# Patient Record
Sex: MALE | Race: WHITE | NOT HISPANIC OR LATINO
[De-identification: names, ages, dates, MRNs, and addresses within clinical notes are randomized per-mention and may not be internally consistent; named-entity substitution may affect disease eponyms.]

---

## 2021-05-24 ENCOUNTER — TRANSCRIPTION ENCOUNTER (OUTPATIENT)
Age: 66
End: 2021-05-24

## 2022-07-23 ENCOUNTER — NON-APPOINTMENT (OUTPATIENT)
Age: 67
End: 2022-07-23

## 2022-11-21 ENCOUNTER — APPOINTMENT (OUTPATIENT)
Dept: ORTHOPEDIC SURGERY | Facility: CLINIC | Age: 67
End: 2022-11-21

## 2023-12-01 ENCOUNTER — APPOINTMENT (OUTPATIENT)
Dept: ORTHOPEDIC SURGERY | Facility: CLINIC | Age: 68
End: 2023-12-01
Payer: MEDICARE

## 2023-12-01 VITALS — WEIGHT: 220 LBS | BODY MASS INDEX: 30.8 KG/M2 | HEIGHT: 71 IN

## 2023-12-01 DIAGNOSIS — Z86.79 PERSONAL HISTORY OF OTHER DISEASES OF THE CIRCULATORY SYSTEM: ICD-10-CM

## 2023-12-01 DIAGNOSIS — M25.462 EFFUSION, LEFT KNEE: ICD-10-CM

## 2023-12-01 PROCEDURE — 20611 DRAIN/INJ JOINT/BURSA W/US: CPT | Mod: 50

## 2023-12-01 PROCEDURE — 73564 X-RAY EXAM KNEE 4 OR MORE: CPT | Mod: 50

## 2023-12-01 PROCEDURE — 99204 OFFICE O/P NEW MOD 45 MIN: CPT | Mod: 25

## 2023-12-01 PROCEDURE — J3490M: CUSTOM | Mod: NC

## 2023-12-01 RX ORDER — ATORVASTATIN CALCIUM 80 MG/1
TABLET, FILM COATED ORAL
Refills: 0 | Status: ACTIVE | COMMUNITY

## 2023-12-01 RX ORDER — METOPROLOL TARTRATE 75 MG/1
TABLET, FILM COATED ORAL
Refills: 0 | Status: ACTIVE | COMMUNITY

## 2023-12-01 RX ORDER — AMLODIPINE BESYLATE 5 MG/1
TABLET ORAL
Refills: 0 | Status: ACTIVE | COMMUNITY

## 2024-01-08 ENCOUNTER — APPOINTMENT (OUTPATIENT)
Dept: ORTHOPEDIC SURGERY | Facility: CLINIC | Age: 69
End: 2024-01-08
Payer: MEDICARE

## 2024-01-08 VITALS — BODY MASS INDEX: 30.8 KG/M2 | WEIGHT: 220 LBS | HEIGHT: 71 IN

## 2024-01-08 PROCEDURE — 99215 OFFICE O/P EST HI 40 MIN: CPT

## 2024-01-08 RX ORDER — TRAMADOL HYDROCHLORIDE 50 MG/1
50 TABLET, COATED ORAL EVERY 8 HOURS
Qty: 30 | Refills: 0 | Status: ACTIVE | COMMUNITY
Start: 2024-01-08 | End: 1900-01-01

## 2024-01-08 NOTE — IMAGING
[Bilateral] : knee bilaterally [advanced tricompartmental OA with medial compartment narrowing and varus alignment] : advanced tricompartmental OA with medial compartment narrowing and varus alignment [advanced tricompartmental OA with lateral compartment narrowing and valgus alignment] : advanced tricompartmental OA with lateral compartment narrowing and valgus alignment [de-identified] : B/L knee exam:  large effusion  L knee large baker cyst L knee MJL tenderness 4/5 hip flexion  5/5 quad and hamstring  +2 PT   No pain with range of motion bilateral hips

## 2024-01-08 NOTE — DISCUSSION/SUMMARY
[de-identified] : The natural progression of Osteoarthritis was explained to the patient. We discussed the possible treatment options from conservative to operative. These included NSAIDS, Glucosamine and Chondroitin sulfate, and Physical Therapy as well different types of injections. We also discussed that at some point they may progress to needing a TKA.  Information and pamphlets were given when appropriate.   Patient Complains of pain in Knee with a level that often reaches greater than an 8/10. The Pain has been progressively worsening of his/her treatment course. The pain has interfered with their ADLs and worsens with weight bearing. On exam they often have episodes of swelling/effusion with limited ROM. Pain worsens with ROM passive and active and I can palpate crepitus.   X-rays were reviewed with the patient, and they show joint space narrowing, subchondral sclerosis, osteophyte formation, and subchondral cysts.   After a period of more than 12 weeks physical therapy or exercise program done with me or another treating physician, they have continued pain. The patient has failed a trial of NSAID medication or pain relievers if they were unable to tolerate NSAID medications as well as a series of injection, steroid or Hyaluronic Acid. After a long discussion with the patient both the patient and I have decided we have exhausted all forms of less radical treatments, and they would like to proceed with Total Knee Replacement   We discussed my findings and the natural history of their condition. We talked about the details of the proposed surgery and the recovery. We discussed the material risks, possible benefits and alternatives to surgery. The risks include but are not limited to infection, bleeding and possible need for blood transfusion, fracture, bowel blockage, bladder retention or infection, need for reoperation, stiffness and/or limited range of motion, possible damage to nerves and blood vessels, failure of fixation of components, risk of deep vein thromboses and pulmonary embolism, wound healing problems, dislocation, and possible leg length discrepancy. Although incredibly rare, we also discussed the risks of a cardiac event, stroke and even death during, or following, the surgery. We discussed the type of implants the patient will be receiving and the type of fixation that will be used, as well as whether a robot or computer navigation aide will be used. The patient understands they will need medical clearance and will attend a preoperative joint education class. We also discussed the type of anesthesia they will receive, and the risks associated with hospital or rehab length of stay, obesity, diabetes and smoking.

## 2024-01-08 NOTE — ASSESSMENT
[FreeTextEntry1] : 1/8/2024: Discussed treatment options for B/L Adv Knee OA.  Patient does have some complex history with a neurogenic bladder and self-catheterization history of failed stent with collaterals atrial fibrillation and on Eliquis.  Also had a history of 2 small seizures recently and is on Keppra for this.  PT has exhausted conservative treatment options in the form of PT and injection therapy. Discussed Knee TKA. PT inquired about doing both knees but discussed risks due to his medical hx  and would recommend doing 1 TKA  at a time. PT would like to proceed with L  knee TKA and   R knee TKA. Plan for B/L  Knee CT scan in compliance with RUI protocol. PT is not a candidate for NSAIDs due to being on Eliquis. Pt will obtain neurological and cardiac clearance before surgery

## 2024-01-08 NOTE — HISTORY OF PRESENT ILLNESS
[10] : 10 [de-identified] : 1/8/24: Prev seen by Martín and reports B/L knee pain. Had prev aspiration last month. Pt expresses 2 weeks of relief from previous injection therapy. Pt has stent and hx of  ablations and seizures. Pt is on Kepra and Eliquis. Pt has neurological and bladder medical complications and Cerasis.    [] : no [FreeTextEntry5] : Here for LAURA knee evaluation. Amish Juarez. Pain started 2021. Has gotten viso 3 injection in 11/13/23 by an outside orthopedic. [place] no relief. Seen Anjelica stated he had arthritis. Had done aspiration and CSI felt relief for couple days. .   [de-identified] : XR

## 2024-01-11 ENCOUNTER — APPOINTMENT (OUTPATIENT)
Dept: CT IMAGING | Facility: CLINIC | Age: 69
End: 2024-01-11

## 2024-01-11 PROCEDURE — ZZZZZ: CPT

## 2024-01-22 ENCOUNTER — APPOINTMENT (OUTPATIENT)
Dept: ORTHOPEDIC SURGERY | Facility: CLINIC | Age: 69
End: 2024-01-22
Payer: MEDICARE

## 2024-01-22 VITALS — BODY MASS INDEX: 30.8 KG/M2 | HEIGHT: 71 IN | WEIGHT: 220 LBS

## 2024-01-22 PROCEDURE — 20610 DRAIN/INJ JOINT/BURSA W/O US: CPT | Mod: 50

## 2024-01-22 PROCEDURE — 99214 OFFICE O/P EST MOD 30 MIN: CPT | Mod: 25

## 2024-01-22 NOTE — IMAGING
[Bilateral] : knee bilaterally [advanced tricompartmental OA with medial compartment narrowing and varus alignment] : advanced tricompartmental OA with medial compartment narrowing and varus alignment [advanced tricompartmental OA with lateral compartment narrowing and valgus alignment] : advanced tricompartmental OA with lateral compartment narrowing and valgus alignment [de-identified] : B/L knee exam:  large effusion  L knee large baker cyst L knee MJL tenderness 4/5 hip flexion  5/5 quad and hamstring  +2 PT   No pain with range of motion bilateral hips

## 2024-01-22 NOTE — DISCUSSION/SUMMARY
[de-identified] : The natural progression of Osteoarthritis was explained to the patient. We discussed the possible treatment options from conservative to operative. These included NSAIDS, Glucosamine and Chondroitin sulfate, and Physical Therapy as well different types of injections. We also discussed that at some point they may progress to needing a TKA.  Information and pamphlets were given when appropriate.   Patient Complains of pain in Knee with a level that often reaches greater than an 8/10. The Pain has been progressively worsening of his/her treatment course. The pain has interfered with their ADLs and worsens with weight bearing. On exam they often have episodes of swelling/effusion with limited ROM. Pain worsens with ROM passive and active and I can palpate crepitus.   X-rays were reviewed with the patient, and they show joint space narrowing, subchondral sclerosis, osteophyte formation, and subchondral cysts.   After a period of more than 12 weeks physical therapy or exercise program done with me or another treating physician, they have continued pain. The patient has failed a trial of NSAID medication or pain relievers if they were unable to tolerate NSAID medications as well as a series of injection, steroid or Hyaluronic Acid. After a long discussion with the patient both the patient and I have decided we have exhausted all forms of less radical treatments, and they would like to proceed with Total Knee Replacement   We discussed my findings and the natural history of their condition. We talked about the details of the proposed surgery and the recovery. We discussed the material risks, possible benefits and alternatives to surgery. The risks include but are not limited to infection, bleeding and possible need for blood transfusion, fracture, bowel blockage, bladder retention or infection, need for reoperation, stiffness and/or limited range of motion, possible damage to nerves and blood vessels, failure of fixation of components, risk of deep vein thromboses and pulmonary embolism, wound healing problems, dislocation, and possible leg length discrepancy. Although incredibly rare, we also discussed the risks of a cardiac event, stroke and even death during, or following, the surgery. We discussed the type of implants the patient will be receiving and the type of fixation that will be used, as well as whether a robot or computer navigation aide will be used. The patient understands they will need medical clearance and will attend a preoperative joint education class. We also discussed the type of anesthesia they will receive, and the risks associated with hospital or rehab length of stay, obesity, diabetes and smoking.

## 2024-01-22 NOTE — PROCEDURE
[Large Joint Injection] : Large joint injection [Bilateral] : bilaterally of the [Knee] : knee [Pain] : pain [Inflammation] : inflammation [X-ray evidence of Osteoarthritis on this or prior visit] : x-ray evidence of Osteoarthritis on this or prior visit [Betadine] : betadine [Ethyl Chloride sprayed topically] : ethyl chloride sprayed topically [Sterile technique used] : sterile technique used [] : Patient tolerated procedure well [___ cc    32 units 5mg] : Zilretta ~Vcc of 32 units 5 mg  [Call if redness, pain or fever occur] : call if redness, pain or fever occur [Apply ice for 15min out of every hour for the next 12-24 hours as tolerated] : apply ice for 15 minutes out of every hour for the next 12-24 hours as tolerated [Previous OTC use and PT nontherapeutic] : patient has tried OTC's including aspirin, Ibuprofen, Aleve, etc or prescription NSAIDS, and/or exercises at home and/or physical therapy without satisfactory response [Patient had decreased mobility in the joint] : patient had decreased mobility in the joint [Risks, benefits, alternatives discussed / Verbal consent obtained] : the risks benefits, and alternatives have been discussed, and verbal consent was obtained

## 2024-01-22 NOTE — ASSESSMENT
[FreeTextEntry1] : Previous doc: 1/8/2024: Discussed treatment options for B/L Adv Knee OA.  Patient does have some complex history with a neurogenic bladder and self-catheterization history of failed stent with collaterals atrial fibrillation and on Eliquis.  Also had a history of 2 small seizures recently and is on Keppra for this.  PT has exhausted conservative treatment options in the form of PT and injection therapy. Discussed Knee TKA. PT inquired about doing both knees but discussed risks due to his medical hx  and would recommend doing 1 TKA  at a time. PT would like to proceed with L  knee TKA and   R knee TKA. Plan for B/L  Knee CT scan in compliance with RUI protocol. PT is not a candidate for NSAIDs due to being on Eliquis. Pt will obtain neurological and cardiac clearance before surgery  1/22/24: Planning for TKA in April, will try zilretta both knees today and proceed with surgery end of April.  CT reviewed.

## 2024-01-22 NOTE — HISTORY OF PRESENT ILLNESS
[10] : 10 [de-identified] : 1/22/24: Worsening pain, had CT both knees done and scheduled for TKA April 24.  Previous doc: 1/8/24: Prev seen by Martín and reports B/L knee pain. Had prev aspiration last month. Pt expresses 2 weeks of relief from previous injection therapy. Pt has stent and hx of  ablations and seizures. Pt is on Kepra and Eliquis. Pt has neurological and bladder medical complications and Cerasis.    [] : no [FreeTextEntry5] : wants to get csi inj.  [de-identified] : XR  foot pain/injury

## 2024-03-23 ENCOUNTER — NON-APPOINTMENT (OUTPATIENT)
Age: 69
End: 2024-03-23

## 2024-04-04 ENCOUNTER — OUTPATIENT (OUTPATIENT)
Dept: OUTPATIENT SERVICES | Facility: HOSPITAL | Age: 69
LOS: 1 days | Discharge: HOME | End: 2024-04-04
Payer: MEDICARE

## 2024-04-04 VITALS
HEART RATE: 90 BPM | HEIGHT: 71 IN | DIASTOLIC BLOOD PRESSURE: 85 MMHG | SYSTOLIC BLOOD PRESSURE: 138 MMHG | WEIGHT: 220.46 LBS | OXYGEN SATURATION: 95 % | RESPIRATION RATE: 18 BRPM | TEMPERATURE: 98 F

## 2024-04-04 DIAGNOSIS — M17.12 UNILATERAL PRIMARY OSTEOARTHRITIS, LEFT KNEE: ICD-10-CM

## 2024-04-04 DIAGNOSIS — Z98.890 OTHER SPECIFIED POSTPROCEDURAL STATES: Chronic | ICD-10-CM

## 2024-04-04 DIAGNOSIS — R56.9 UNSPECIFIED CONVULSIONS: ICD-10-CM

## 2024-04-04 DIAGNOSIS — R33.9 RETENTION OF URINE, UNSPECIFIED: ICD-10-CM

## 2024-04-04 DIAGNOSIS — Z01.818 ENCOUNTER FOR OTHER PREPROCEDURAL EXAMINATION: ICD-10-CM

## 2024-04-04 LAB
A1C WITH ESTIMATED AVERAGE GLUCOSE RESULT: 5.4 % — SIGNIFICANT CHANGE UP (ref 4–5.6)
ALBUMIN SERPL ELPH-MCNC: 4 G/DL — SIGNIFICANT CHANGE UP (ref 3.3–5)
ALP SERPL-CCNC: 46 U/L — SIGNIFICANT CHANGE UP (ref 40–120)
ALT FLD-CCNC: 40 U/L — SIGNIFICANT CHANGE UP (ref 12–78)
ANION GAP SERPL CALC-SCNC: 9 MMOL/L — SIGNIFICANT CHANGE UP (ref 5–17)
APPEARANCE UR: CLEAR — SIGNIFICANT CHANGE UP
APTT BLD: 35.6 SEC — SIGNIFICANT CHANGE UP (ref 24.5–35.6)
AST SERPL-CCNC: 29 U/L — SIGNIFICANT CHANGE UP (ref 15–37)
BASOPHILS # BLD AUTO: 0.06 K/UL — SIGNIFICANT CHANGE UP (ref 0–0.2)
BASOPHILS NFR BLD AUTO: 0.8 % — SIGNIFICANT CHANGE UP (ref 0–2)
BILIRUB SERPL-MCNC: 1 MG/DL — SIGNIFICANT CHANGE UP (ref 0.2–1.2)
BILIRUB UR-MCNC: NEGATIVE — SIGNIFICANT CHANGE UP
BUN SERPL-MCNC: 11 MG/DL — SIGNIFICANT CHANGE UP (ref 7–23)
CALCIUM SERPL-MCNC: 9.5 MG/DL — SIGNIFICANT CHANGE UP (ref 8.5–10.1)
CHLORIDE SERPL-SCNC: 104 MMOL/L — SIGNIFICANT CHANGE UP (ref 96–108)
CO2 SERPL-SCNC: 27 MMOL/L — SIGNIFICANT CHANGE UP (ref 22–31)
COLOR SPEC: SIGNIFICANT CHANGE UP
CREAT SERPL-MCNC: 0.81 MG/DL — SIGNIFICANT CHANGE UP (ref 0.5–1.3)
DIFF PNL FLD: NEGATIVE — SIGNIFICANT CHANGE UP
EGFR: 96 ML/MIN/1.73M2 — SIGNIFICANT CHANGE UP
EOSINOPHIL # BLD AUTO: 0.05 K/UL — SIGNIFICANT CHANGE UP (ref 0–0.5)
EOSINOPHIL NFR BLD AUTO: 0.7 % — SIGNIFICANT CHANGE UP (ref 0–6)
ESTIMATED AVERAGE GLUCOSE: 108 MG/DL — SIGNIFICANT CHANGE UP (ref 68–114)
GLUCOSE SERPL-MCNC: 94 MG/DL — SIGNIFICANT CHANGE UP (ref 70–99)
GLUCOSE UR QL: NEGATIVE MG/DL — SIGNIFICANT CHANGE UP
HCT VFR BLD CALC: 48.4 % — SIGNIFICANT CHANGE UP (ref 39–50)
HGB BLD-MCNC: 17.2 G/DL — HIGH (ref 13–17)
IMM GRANULOCYTES NFR BLD AUTO: 0.8 % — SIGNIFICANT CHANGE UP (ref 0–0.9)
INR BLD: 1.03 RATIO — SIGNIFICANT CHANGE UP (ref 0.85–1.18)
KETONES UR-MCNC: ABNORMAL MG/DL
LEUKOCYTE ESTERASE UR-ACNC: ABNORMAL
LYMPHOCYTES # BLD AUTO: 1.74 K/UL — SIGNIFICANT CHANGE UP (ref 1–3.3)
LYMPHOCYTES # BLD AUTO: 24.5 % — SIGNIFICANT CHANGE UP (ref 13–44)
MCHC RBC-ENTMCNC: 31.6 PG — SIGNIFICANT CHANGE UP (ref 27–34)
MCHC RBC-ENTMCNC: 35.5 G/DL — SIGNIFICANT CHANGE UP (ref 32–36)
MCV RBC AUTO: 88.8 FL — SIGNIFICANT CHANGE UP (ref 80–100)
MONOCYTES # BLD AUTO: 0.77 K/UL — SIGNIFICANT CHANGE UP (ref 0–0.9)
MONOCYTES NFR BLD AUTO: 10.9 % — SIGNIFICANT CHANGE UP (ref 2–14)
MRSA PCR RESULT.: SIGNIFICANT CHANGE UP
NEUTROPHILS # BLD AUTO: 4.41 K/UL — SIGNIFICANT CHANGE UP (ref 1.8–7.4)
NEUTROPHILS NFR BLD AUTO: 62.3 % — SIGNIFICANT CHANGE UP (ref 43–77)
NITRITE UR-MCNC: NEGATIVE — SIGNIFICANT CHANGE UP
NRBC # BLD: 0 /100 WBCS — SIGNIFICANT CHANGE UP (ref 0–0)
PH UR: 5.5 — SIGNIFICANT CHANGE UP (ref 5–8)
PLATELET # BLD AUTO: 321 K/UL — SIGNIFICANT CHANGE UP (ref 150–400)
POTASSIUM SERPL-MCNC: 3.6 MMOL/L — SIGNIFICANT CHANGE UP (ref 3.5–5.3)
POTASSIUM SERPL-SCNC: 3.6 MMOL/L — SIGNIFICANT CHANGE UP (ref 3.5–5.3)
PROT SERPL-MCNC: 8.2 GM/DL — SIGNIFICANT CHANGE UP (ref 6–8.3)
PROT UR-MCNC: SIGNIFICANT CHANGE UP MG/DL
PROTHROM AB SERPL-ACNC: 12.3 SEC — SIGNIFICANT CHANGE UP (ref 9.5–13)
RBC # BLD: 5.45 M/UL — SIGNIFICANT CHANGE UP (ref 4.2–5.8)
RBC # FLD: 12.8 % — SIGNIFICANT CHANGE UP (ref 10.3–14.5)
S AUREUS DNA NOSE QL NAA+PROBE: SIGNIFICANT CHANGE UP
SODIUM SERPL-SCNC: 140 MMOL/L — SIGNIFICANT CHANGE UP (ref 135–145)
SP GR SPEC: 1.02 — SIGNIFICANT CHANGE UP (ref 1–1.03)
UROBILINOGEN FLD QL: 0.2 MG/DL — SIGNIFICANT CHANGE UP (ref 0.2–1)
WBC # BLD: 7.09 K/UL — SIGNIFICANT CHANGE UP (ref 3.8–10.5)
WBC # FLD AUTO: 7.09 K/UL — SIGNIFICANT CHANGE UP (ref 3.8–10.5)

## 2024-04-04 NOTE — H&P PST ADULT - GENITOURINARY COMMENTS
self catheterize self catheterize, sent u/a and c/s today self cathfor last 2 yrs 4-7 times a day, sees urologist lamont 3-6 months

## 2024-04-04 NOTE — OCCUPATIONAL THERAPY INITIAL EVALUATION ADULT - GROOMING, PREVIOUS LEVEL OF FUNCTION, OT EVAL
"Lab work downstairs today.  Directions:  As you walk through the first floor, you'll see (on the right) first the pharmacy, then some bathrooms, then the \"Lab and Imaging\" area. Give them your name at the window there and wait for them to call you.     Begin lisniopril 10 mg daily.    Colonoscopy: you can call [phone] to set up your colonoscopy.  If they have not heard from you, they may call you directly.     Work on weight loss and exercise.       Preventive Health Recommendations  Male Ages 40 to 49    Yearly exam:             See your health care provider every year in order to  o   Review health changes.   o   Discuss preventive care.    o   Review your medicines if your doctor has prescribed any.    You should be tested each year for STDs (sexually transmitted diseases) if you re at risk.     Have a cholesterol test every 5 years.     Have a colonoscopy (test for colon cancer) if someone in your family has had colon cancer or polyps before age 50.     After age 45, have a diabetes test (fasting glucose). If you are at risk for diabetes, you should have this test every 3 years.      Talk with your health care provider about whether or not a prostate cancer screening test (PSA) is right for you.    Shots: Get a flu shot each year. Get a tetanus shot every 10 years.     Nutrition:    Eat at least 5 servings of fruits and vegetables daily.     Eat whole-grain bread, whole-wheat pasta and brown rice instead of white grains and rice.     Talk to your provider about Calcium and Vitamin D.     Lifestyle    Exercise for at least 150 minutes a week (30 minutes a day, 5 days a week). This will help you control your weight and prevent disease.     Limit alcohol to one drink per day.     No smoking.     Wear sunscreen to prevent skin cancer.     See your dentist every six months for an exam and cleaning.      " independent

## 2024-04-04 NOTE — H&P PST ADULT - PROBLEM SELECTOR PLAN 1
Robotic assisted left total knee replacement.    Labs - CBC, BMP, PT/PTT, vitamin d, T&S, nose culture and EKG done   Medical eval advised  Preop 3 day antibacterial wash  instruction reviewed and given, MRSA and MSSA screening done today.  Avoid NSAID and OTC supplements for 7 days  Continue all prescribed meds as instructed  NPO after 11pm the day before surgery. Take medicines as advised the morning of surgery with sips of water.  Vaccinated for covid.   verbalized understanding of all instructions.

## 2024-04-04 NOTE — H&P PST ADULT - NSICDXFAMILYHX_GEN_ALL_CORE_FT
FAMILY HISTORY:  Father  Still living? Unknown  FH: heart attack, Age at diagnosis: Age Unknown    Mother  Still living? No  FH: heart attack, Age at diagnosis: Age Unknown

## 2024-04-04 NOTE — H&P PST ADULT - ASSESSMENT
osteoarthritis left knee  CAPRINI SCORE [CLOT]    AGE RELATED RISK FACTORS                                                       MOBILITY RELATED FACTORS  [ ] Age 41-60 years                                            (1 Point)                  [ ] Bed rest                                                        (1 Point)  [x ] Age: 61-74 years                                           (2 Points)                 [ ] Plaster cast                                                   (2 Points)  [ ] Age= 75 years                                              (3 Points)                 [ ] Bed bound for more than 72 hours                 (2 Points)    DISEASE RELATED RISK FACTORS                                               GENDER SPECIFIC FACTORS  [ ] Edema in the lower extremities                       (1 Point)                  [ ] Pregnancy                                                     (1 Point)  [ ] Varicose veins                                               (1 Point)                  [ ] Post-partum < 6 weeks                                   (1 Point)             [x ] BMI > 25 Kg/m2                                            (1 Point)                  [ ] Hormonal therapy  or oral contraception          (1 Point)                 [ ] Sepsis (in the previous month)                        (1 Point)                  [ ] History of pregnancy complications                 (1 point)  [ ] Pneumonia or serious lung disease                                               [ ] Unexplained or recurrent                     (1 Point)           (in the previous month)                               (1 Point)  [ ] Abnormal pulmonary function test                     (1 Point)                 SURGERY RELATED RISK FACTORS  [ ] Acute myocardial infarction                              (1 Point)                 [ ]  Section                                             (1 Point)  [ ] Congestive heart failure (in the previous month)  (1 Point)               [ ] Minor surgery                                                  (1 Point)   [ ] Inflammatory bowel disease                             (1 Point)                 [ ] Arthroscopic surgery                                        (2 Points)  [ ] Central venous access                                      (2 Points)                [ ] General surgery lasting more than 45 minutes   (2 Points)       [ ] Stroke (in the previous month)                          (5 Points)               [x ] Elective arthroplasty                                         (5 Points)                                                                                                                                               HEMATOLOGY RELATED FACTORS                                                 TRAUMA RELATED RISK FACTORS  [ ] Prior episodes of VTE                                     (3 Points)                [ ] Fracture of the hip, pelvis, or leg                       (5 Points)  [ ] Positive family history for VTE                         (3 Points)                 [ ] Acute spinal cord injury (in the previous month)  (5 Points)  [ ] Prothrombin 36153 A                                     (3 Points)                 [ ] Paralysis  (less than 1 month)                             (5 Points)  [ ] Factor V Leiden                                             (3 Points)                  [ ] Multiple Trauma within 1 month                        (5 Points)  [ ] Lupus anticoagulants                                     (3 Points)                                                           [ ] Anticardiolipin antibodies                               (3 Points)                                                       [ ] High homocysteine in the blood                      (3 Points)                                             [ ] Other congenital or acquired thrombophilia      (3 Points)                                                [ ] Heparin induced thrombocytopenia                  (3 Points)                                          Total Score [   8       ]    Caprini Score 0 - 2:  Low Risk, No VTE Prophylaxis required for most patients, encourage ambulation  Caprini Score 3 - 6:  At Risk, pharmacologic VTE prophylaxis is indicated for most patients (in the absence of a contraindication)  Caprini Score Greater than or = 7:  High Risk, pharmacologic VTE prophylaxis is indicated for most patients (in the absence of a contraindication)

## 2024-04-04 NOTE — OCCUPATIONAL THERAPY INITIAL EVALUATION ADULT - PERTINENT HX OF CURRENT PROBLEM, REHAB EVAL
L knee OA which impacts pts ability to perform functional tasks/transfers and mobility. Pt is scheduled for L TKR on 4/24/24.

## 2024-04-04 NOTE — OCCUPATIONAL THERAPY INITIAL EVALUATION ADULT - ADDITIONAL COMMENTS
Pt lives with wife (Who can assist post op) in a private house with 5 steps to enter with a L handrail. Once inside, the pt has 2 steps with a R handrail to reach the main floor where the bedroom and bathroom is. The pt bathroom has a tub/shower combination, fixed/retractable shower head, comfort height toilet seat and 2x grab bars. The pt ambulates with no device and does not own any device for ambulation. The pt daily pain is a 5/10 at rest and a 10/10 with movement. The pt manages the pain with rest and does not take any pain medication. The pt has no recent outpatient PT, no recent falls and has no buckling of the legs. The pt wears glasses all the time, R handed, drives and has no hearing impairments.

## 2024-04-04 NOTE — H&P PST ADULT - HISTORY OF PRESENT ILLNESS
2 yrs gell shots x2 and cortisone 67 y/o male, PMH of HTN, HLD, seizures, Afib, urinary retention self cath daily about 7 times. In PST today with c/o of pain in the left knee for 2 yrs had gell shots x2 and cortisone injection in the past. Seen by Dr Krishna, scheduled for robotic assisted left total knee replacement on 4/24/24.  Pre op testing today.  Goal: to walk without  pain.

## 2024-04-04 NOTE — H&P PST ADULT - MUSCULOSKELETAL
no joint warmth/no calf tenderness/decreased ROM due to pain/joint swelling/no chest wall tenderness details…

## 2024-04-04 NOTE — H&P PST ADULT - NSICDXPASTMEDICALHX_GEN_ALL_CORE_FT
PAST MEDICAL HISTORY:  Seizures      PAST MEDICAL HISTORY:  HLD (hyperlipidemia)     HTN (hypertension)     Seizures     Urinary retention

## 2024-04-05 LAB
CULTURE RESULTS: SIGNIFICANT CHANGE UP
SPECIMEN SOURCE: SIGNIFICANT CHANGE UP
VIT D25+D1,25 OH+D1,25 PNL SERPL-MCNC: 58.5 PG/ML — SIGNIFICANT CHANGE UP (ref 19.9–79.3)

## 2024-04-08 LAB — LEVETIRACETAM SERPL-MCNC: 23.9 UG/ML — SIGNIFICANT CHANGE UP (ref 10–40)

## 2024-04-23 ENCOUNTER — TRANSCRIPTION ENCOUNTER (OUTPATIENT)
Age: 69
End: 2024-04-23

## 2024-04-23 ENCOUNTER — RESULT REVIEW (OUTPATIENT)
Age: 69
End: 2024-04-23

## 2024-04-23 PROCEDURE — 88311 DECALCIFY TISSUE: CPT | Mod: 26

## 2024-04-23 PROCEDURE — 88305 TISSUE EXAM BY PATHOLOGIST: CPT | Mod: 26

## 2024-04-24 ENCOUNTER — TRANSCRIPTION ENCOUNTER (OUTPATIENT)
Age: 69
End: 2024-04-24

## 2024-04-24 ENCOUNTER — APPOINTMENT (OUTPATIENT)
Dept: ORTHOPEDIC SURGERY | Facility: HOSPITAL | Age: 69
End: 2024-04-24
Payer: MEDICARE

## 2024-04-24 ENCOUNTER — INPATIENT (INPATIENT)
Facility: HOSPITAL | Age: 69
LOS: 0 days | Discharge: HOME HEALTH | End: 2024-04-25
Attending: ORTHOPAEDIC SURGERY | Admitting: ORTHOPAEDIC SURGERY
Payer: MEDICARE

## 2024-04-24 VITALS
HEART RATE: 88 BPM | HEIGHT: 71 IN | RESPIRATION RATE: 16 BRPM | SYSTOLIC BLOOD PRESSURE: 126 MMHG | TEMPERATURE: 98 F | DIASTOLIC BLOOD PRESSURE: 82 MMHG | OXYGEN SATURATION: 97 % | WEIGHT: 210.98 LBS

## 2024-04-24 DIAGNOSIS — Z98.890 OTHER SPECIFIED POSTPROCEDURAL STATES: Chronic | ICD-10-CM

## 2024-04-24 LAB
ANION GAP SERPL CALC-SCNC: 8 MMOL/L — SIGNIFICANT CHANGE UP (ref 5–17)
BUN SERPL-MCNC: 12 MG/DL — SIGNIFICANT CHANGE UP (ref 7–23)
CALCIUM SERPL-MCNC: 9.1 MG/DL — SIGNIFICANT CHANGE UP (ref 8.5–10.1)
CHLORIDE SERPL-SCNC: 106 MMOL/L — SIGNIFICANT CHANGE UP (ref 96–108)
CO2 SERPL-SCNC: 26 MMOL/L — SIGNIFICANT CHANGE UP (ref 22–31)
CREAT SERPL-MCNC: 1.02 MG/DL — SIGNIFICANT CHANGE UP (ref 0.5–1.3)
EGFR: 80 ML/MIN/1.73M2 — SIGNIFICANT CHANGE UP
GLUCOSE BLDC GLUCOMTR-MCNC: 127 MG/DL — HIGH (ref 70–99)
GLUCOSE SERPL-MCNC: 119 MG/DL — HIGH (ref 70–99)
GRAM STN FLD: SIGNIFICANT CHANGE UP
GRAM STN FLD: SIGNIFICANT CHANGE UP
HCT VFR BLD CALC: 43.2 % — SIGNIFICANT CHANGE UP (ref 39–50)
HGB BLD-MCNC: 14.9 G/DL — SIGNIFICANT CHANGE UP (ref 13–17)
MCHC RBC-ENTMCNC: 31.5 PG — SIGNIFICANT CHANGE UP (ref 27–34)
MCHC RBC-ENTMCNC: 34.5 G/DL — SIGNIFICANT CHANGE UP (ref 32–36)
MCV RBC AUTO: 91.3 FL — SIGNIFICANT CHANGE UP (ref 80–100)
NRBC # BLD: 0 /100 WBCS — SIGNIFICANT CHANGE UP (ref 0–0)
PLATELET # BLD AUTO: 230 K/UL — SIGNIFICANT CHANGE UP (ref 150–400)
POTASSIUM SERPL-MCNC: 4.5 MMOL/L — SIGNIFICANT CHANGE UP (ref 3.5–5.3)
POTASSIUM SERPL-SCNC: 4.5 MMOL/L — SIGNIFICANT CHANGE UP (ref 3.5–5.3)
RBC # BLD: 4.73 M/UL — SIGNIFICANT CHANGE UP (ref 4.2–5.8)
RBC # FLD: 12.9 % — SIGNIFICANT CHANGE UP (ref 10.3–14.5)
SODIUM SERPL-SCNC: 140 MMOL/L — SIGNIFICANT CHANGE UP (ref 135–145)
SPECIMEN SOURCE: SIGNIFICANT CHANGE UP
SPECIMEN SOURCE: SIGNIFICANT CHANGE UP
WBC # BLD: 8.04 K/UL — SIGNIFICANT CHANGE UP (ref 3.8–10.5)
WBC # FLD AUTO: 8.04 K/UL — SIGNIFICANT CHANGE UP (ref 3.8–10.5)

## 2024-04-24 PROCEDURE — 27447 TOTAL KNEE ARTHROPLASTY: CPT | Mod: RT

## 2024-04-24 PROCEDURE — 20985 CPTR-ASST DIR MS PX: CPT

## 2024-04-24 PROCEDURE — 73560 X-RAY EXAM OF KNEE 1 OR 2: CPT | Mod: 26,LT

## 2024-04-24 DEVICE — COMP FEM CR CMNTLSS BEADED W/ PA SZ 5 LT: Type: IMPLANTABLE DEVICE | Site: LEFT | Status: FUNCTIONAL

## 2024-04-24 DEVICE — BASEPLATE TIB TRIATHLON TRITAN SZ 6: Type: IMPLANTABLE DEVICE | Site: LEFT | Status: FUNCTIONAL

## 2024-04-24 DEVICE — PATELLA ASYMMETRIC 38MM TRIATHLON: Type: IMPLANTABLE DEVICE | Site: LEFT | Status: FUNCTIONAL

## 2024-04-24 DEVICE — MAKO BONE PIN 3.2MM X 140MM: Type: IMPLANTABLE DEVICE | Site: LEFT | Status: FUNCTIONAL

## 2024-04-24 DEVICE — MAKO BONE PIN 3.2MM X 110MM: Type: IMPLANTABLE DEVICE | Site: LEFT | Status: FUNCTIONAL

## 2024-04-24 DEVICE — INSERT TIB BEARING CS X3 SZ 6 9MM: Type: IMPLANTABLE DEVICE | Site: LEFT | Status: FUNCTIONAL

## 2024-04-24 RX ORDER — OXYCODONE HYDROCHLORIDE 5 MG/1
15 TABLET ORAL EVERY 4 HOURS
Refills: 0 | Status: DISCONTINUED | OUTPATIENT
Start: 2024-04-24 | End: 2024-04-25

## 2024-04-24 RX ORDER — ONDANSETRON 8 MG/1
4 TABLET, FILM COATED ORAL EVERY 6 HOURS
Refills: 0 | Status: DISCONTINUED | OUTPATIENT
Start: 2024-04-24 | End: 2024-04-25

## 2024-04-24 RX ORDER — ISOSORBIDE MONONITRATE 60 MG/1
30 TABLET, EXTENDED RELEASE ORAL
Refills: 0 | Status: DISCONTINUED | OUTPATIENT
Start: 2024-04-24 | End: 2024-04-25

## 2024-04-24 RX ORDER — SODIUM CHLORIDE 9 MG/ML
1000 INJECTION, SOLUTION INTRAVENOUS
Refills: 0 | Status: DISCONTINUED | OUTPATIENT
Start: 2024-04-24 | End: 2024-04-24

## 2024-04-24 RX ORDER — AMLODIPINE BESYLATE 2.5 MG/1
5 TABLET ORAL AT BEDTIME
Refills: 0 | Status: DISCONTINUED | OUTPATIENT
Start: 2024-04-24 | End: 2024-04-25

## 2024-04-24 RX ORDER — ACETAMINOPHEN 500 MG
1000 TABLET ORAL EVERY 8 HOURS
Refills: 0 | Status: DISCONTINUED | OUTPATIENT
Start: 2024-04-24 | End: 2024-04-25

## 2024-04-24 RX ORDER — EZETIMIBE 10 MG/1
1 TABLET ORAL
Refills: 0 | DISCHARGE

## 2024-04-24 RX ORDER — APIXABAN 2.5 MG/1
2.5 TABLET, FILM COATED ORAL
Refills: 0 | Status: DISCONTINUED | OUTPATIENT
Start: 2024-04-25 | End: 2024-04-25

## 2024-04-24 RX ORDER — METOPROLOL TARTRATE 50 MG
50 TABLET ORAL AT BEDTIME
Refills: 0 | Status: DISCONTINUED | OUTPATIENT
Start: 2024-04-24 | End: 2024-04-25

## 2024-04-24 RX ORDER — MAGNESIUM HYDROXIDE 400 MG/1
30 TABLET, CHEWABLE ORAL DAILY
Refills: 0 | Status: DISCONTINUED | OUTPATIENT
Start: 2024-04-24 | End: 2024-04-25

## 2024-04-24 RX ORDER — ISOSORBIDE DINITRATE 5 MG/1
30 TABLET ORAL
Refills: 0 | Status: DISCONTINUED | OUTPATIENT
Start: 2024-04-24 | End: 2024-04-24

## 2024-04-24 RX ORDER — KETOROLAC TROMETHAMINE 30 MG/ML
30 SYRINGE (ML) INJECTION EVERY 8 HOURS
Refills: 0 | Status: DISCONTINUED | OUTPATIENT
Start: 2024-04-24 | End: 2024-04-25

## 2024-04-24 RX ORDER — SENNA PLUS 8.6 MG/1
2 TABLET ORAL AT BEDTIME
Refills: 0 | Status: DISCONTINUED | OUTPATIENT
Start: 2024-04-24 | End: 2024-04-25

## 2024-04-24 RX ORDER — OXYCODONE HYDROCHLORIDE 5 MG/1
10 TABLET ORAL
Refills: 0 | Status: DISCONTINUED | OUTPATIENT
Start: 2024-04-24 | End: 2024-04-25

## 2024-04-24 RX ORDER — BENZOCAINE AND MENTHOL 5; 1 G/100ML; G/100ML
1 LIQUID ORAL
Refills: 0 | Status: DISCONTINUED | OUTPATIENT
Start: 2024-04-24 | End: 2024-04-25

## 2024-04-24 RX ORDER — ACETAMINOPHEN 500 MG
650 TABLET ORAL ONCE
Refills: 0 | Status: COMPLETED | OUTPATIENT
Start: 2024-04-24 | End: 2024-04-24

## 2024-04-24 RX ORDER — SODIUM CHLORIDE 9 MG/ML
3 INJECTION INTRAMUSCULAR; INTRAVENOUS; SUBCUTANEOUS EVERY 8 HOURS
Refills: 0 | Status: DISCONTINUED | OUTPATIENT
Start: 2024-04-24 | End: 2024-04-24

## 2024-04-24 RX ORDER — LEVETIRACETAM 250 MG/1
1 TABLET, FILM COATED ORAL
Refills: 0 | DISCHARGE

## 2024-04-24 RX ORDER — ISOSORBIDE DINITRATE 5 MG/1
1 TABLET ORAL
Refills: 0 | DISCHARGE

## 2024-04-24 RX ORDER — PANTOPRAZOLE SODIUM 20 MG/1
40 TABLET, DELAYED RELEASE ORAL
Refills: 0 | Status: DISCONTINUED | OUTPATIENT
Start: 2024-04-24 | End: 2024-04-25

## 2024-04-24 RX ORDER — ISOSORBIDE MONONITRATE 60 MG/1
1 TABLET, EXTENDED RELEASE ORAL
Refills: 0 | DISCHARGE

## 2024-04-24 RX ORDER — METOPROLOL TARTRATE 50 MG
1 TABLET ORAL
Refills: 0 | DISCHARGE

## 2024-04-24 RX ORDER — SODIUM CHLORIDE 9 MG/ML
1000 INJECTION INTRAMUSCULAR; INTRAVENOUS; SUBCUTANEOUS
Refills: 0 | Status: DISCONTINUED | OUTPATIENT
Start: 2024-04-24 | End: 2024-04-24

## 2024-04-24 RX ORDER — ACETAMINOPHEN 500 MG
1000 TABLET ORAL ONCE
Refills: 0 | Status: DISCONTINUED | OUTPATIENT
Start: 2024-04-24 | End: 2024-04-25

## 2024-04-24 RX ORDER — DEXAMETHASONE 0.5 MG/5ML
10 ELIXIR ORAL ONCE
Refills: 0 | Status: COMPLETED | OUTPATIENT
Start: 2024-04-25 | End: 2024-04-25

## 2024-04-24 RX ORDER — LEVETIRACETAM 250 MG/1
1000 TABLET, FILM COATED ORAL
Refills: 0 | Status: DISCONTINUED | OUTPATIENT
Start: 2024-04-24 | End: 2024-04-25

## 2024-04-24 RX ORDER — FENTANYL CITRATE 50 UG/ML
50 INJECTION INTRAVENOUS ONCE
Refills: 0 | Status: DISCONTINUED | OUTPATIENT
Start: 2024-04-24 | End: 2024-04-24

## 2024-04-24 RX ORDER — SODIUM CHLORIDE 9 MG/ML
1000 INJECTION INTRAMUSCULAR; INTRAVENOUS; SUBCUTANEOUS
Refills: 0 | Status: DISCONTINUED | OUTPATIENT
Start: 2024-04-24 | End: 2024-04-25

## 2024-04-24 RX ORDER — CEFAZOLIN SODIUM 1 G
2000 VIAL (EA) INJECTION EVERY 8 HOURS
Refills: 0 | Status: COMPLETED | OUTPATIENT
Start: 2024-04-24 | End: 2024-04-25

## 2024-04-24 RX ORDER — POLYETHYLENE GLYCOL 3350 17 G/17G
17 POWDER, FOR SOLUTION ORAL AT BEDTIME
Refills: 0 | Status: DISCONTINUED | OUTPATIENT
Start: 2024-04-24 | End: 2024-04-25

## 2024-04-24 RX ORDER — CELECOXIB 200 MG/1
200 CAPSULE ORAL EVERY 12 HOURS
Refills: 0 | Status: DISCONTINUED | OUTPATIENT
Start: 2024-04-24 | End: 2024-04-25

## 2024-04-24 RX ORDER — SODIUM CHLORIDE 9 MG/ML
500 INJECTION INTRAMUSCULAR; INTRAVENOUS; SUBCUTANEOUS ONCE
Refills: 0 | Status: COMPLETED | OUTPATIENT
Start: 2024-04-24 | End: 2024-04-24

## 2024-04-24 RX ORDER — ISOSORBIDE MONONITRATE 60 MG/1
60 TABLET, EXTENDED RELEASE ORAL DAILY
Refills: 0 | Status: DISCONTINUED | OUTPATIENT
Start: 2024-04-24 | End: 2024-04-25

## 2024-04-24 RX ORDER — ONDANSETRON 8 MG/1
4 TABLET, FILM COATED ORAL ONCE
Refills: 0 | Status: DISCONTINUED | OUTPATIENT
Start: 2024-04-24 | End: 2024-04-24

## 2024-04-24 RX ORDER — APIXABAN 2.5 MG/1
1 TABLET, FILM COATED ORAL
Refills: 0 | DISCHARGE

## 2024-04-24 RX ORDER — ASPIRIN/CALCIUM CARB/MAGNESIUM 324 MG
0 TABLET ORAL
Refills: 0 | DISCHARGE

## 2024-04-24 RX ORDER — AMLODIPINE BESYLATE 2.5 MG/1
5 TABLET ORAL DAILY
Refills: 0 | Status: DISCONTINUED | OUTPATIENT
Start: 2024-04-24 | End: 2024-04-24

## 2024-04-24 RX ORDER — METOPROLOL TARTRATE 50 MG
50 TABLET ORAL DAILY
Refills: 0 | Status: DISCONTINUED | OUTPATIENT
Start: 2024-04-24 | End: 2024-04-24

## 2024-04-24 RX ORDER — CELECOXIB 200 MG/1
200 CAPSULE ORAL ONCE
Refills: 0 | Status: COMPLETED | OUTPATIENT
Start: 2024-04-24 | End: 2024-04-24

## 2024-04-24 RX ORDER — OXYCODONE HYDROCHLORIDE 5 MG/1
5 TABLET ORAL
Refills: 0 | Status: DISCONTINUED | OUTPATIENT
Start: 2024-04-24 | End: 2024-04-25

## 2024-04-24 RX ORDER — LANOLIN ALCOHOL/MO/W.PET/CERES
3 CREAM (GRAM) TOPICAL AT BEDTIME
Refills: 0 | Status: DISCONTINUED | OUTPATIENT
Start: 2024-04-24 | End: 2024-04-25

## 2024-04-24 RX ADMIN — OXYCODONE HYDROCHLORIDE 10 MILLIGRAM(S): 5 TABLET ORAL at 12:52

## 2024-04-24 RX ADMIN — CELECOXIB 200 MILLIGRAM(S): 200 CAPSULE ORAL at 08:00

## 2024-04-24 RX ADMIN — Medication 100 MILLIGRAM(S): at 18:01

## 2024-04-24 RX ADMIN — CELECOXIB 200 MILLIGRAM(S): 200 CAPSULE ORAL at 18:02

## 2024-04-24 RX ADMIN — OXYCODONE HYDROCHLORIDE 15 MILLIGRAM(S): 5 TABLET ORAL at 23:15

## 2024-04-24 RX ADMIN — Medication 650 MILLIGRAM(S): at 08:00

## 2024-04-24 RX ADMIN — Medication 1000 MILLIGRAM(S): at 14:18

## 2024-04-24 RX ADMIN — Medication 30 MILLIGRAM(S): at 20:00

## 2024-04-24 RX ADMIN — SODIUM CHLORIDE 500 MILLILITER(S): 9 INJECTION INTRAMUSCULAR; INTRAVENOUS; SUBCUTANEOUS at 12:00

## 2024-04-24 RX ADMIN — OXYCODONE HYDROCHLORIDE 15 MILLIGRAM(S): 5 TABLET ORAL at 20:03

## 2024-04-24 RX ADMIN — POLYETHYLENE GLYCOL 3350 17 GRAM(S): 17 POWDER, FOR SOLUTION ORAL at 23:16

## 2024-04-24 RX ADMIN — OXYCODONE HYDROCHLORIDE 15 MILLIGRAM(S): 5 TABLET ORAL at 16:40

## 2024-04-24 RX ADMIN — Medication 1000 MILLIGRAM(S): at 23:15

## 2024-04-24 RX ADMIN — Medication 650 MILLIGRAM(S): at 07:07

## 2024-04-24 RX ADMIN — Medication 1000 MILLIGRAM(S): at 15:31

## 2024-04-24 RX ADMIN — CELECOXIB 200 MILLIGRAM(S): 200 CAPSULE ORAL at 07:07

## 2024-04-24 RX ADMIN — SENNA PLUS 2 TABLET(S): 8.6 TABLET ORAL at 23:15

## 2024-04-24 RX ADMIN — LEVETIRACETAM 1000 MILLIGRAM(S): 250 TABLET, FILM COATED ORAL at 18:57

## 2024-04-24 RX ADMIN — OXYCODONE HYDROCHLORIDE 10 MILLIGRAM(S): 5 TABLET ORAL at 13:52

## 2024-04-24 RX ADMIN — OXYCODONE HYDROCHLORIDE 15 MILLIGRAM(S): 5 TABLET ORAL at 15:37

## 2024-04-24 RX ADMIN — SODIUM CHLORIDE 120 MILLILITER(S): 9 INJECTION INTRAMUSCULAR; INTRAVENOUS; SUBCUTANEOUS at 12:55

## 2024-04-24 RX ADMIN — Medication 30 MILLIGRAM(S): at 19:03

## 2024-04-24 RX ADMIN — SODIUM CHLORIDE 125 MILLILITER(S): 9 INJECTION, SOLUTION INTRAVENOUS at 11:36

## 2024-04-24 RX ADMIN — AMLODIPINE BESYLATE 5 MILLIGRAM(S): 2.5 TABLET ORAL at 23:14

## 2024-04-24 RX ADMIN — SODIUM CHLORIDE 100 MILLILITER(S): 9 INJECTION INTRAMUSCULAR; INTRAVENOUS; SUBCUTANEOUS at 23:14

## 2024-04-24 RX ADMIN — ISOSORBIDE MONONITRATE 30 MILLIGRAM(S): 60 TABLET, EXTENDED RELEASE ORAL at 23:14

## 2024-04-24 RX ADMIN — OXYCODONE HYDROCHLORIDE 15 MILLIGRAM(S): 5 TABLET ORAL at 19:02

## 2024-04-24 NOTE — CONSULT NOTE ADULT - SUBJECTIVE AND OBJECTIVE BOX
DESHAUN MAYO is a 68y Male s/p ROBOTIC ASSISTED LEFT TOTAL KNEE ARTHROPLASTY WITH RUI      w/ h/o Seizures    HTN (hypertension)    HLD (hyperlipidemia)    Urinary retention      denies any chest pain shortness of breath palpitation dizziness lightheadedness nausea vomiting fever or chills    H/O cardiac radiofrequency ablation      FH: heart attack (Father, Mother)      SH: doesnot smoke or drink at this time    No Known Allergies    acetaminophen     Tablet .. 1000 milliGRAM(s) Oral every 8 hours  acetaminophen   IVPB .. 1000 milliGRAM(s) IV Intermittent once  acetaminophen   IVPB .. 1000 milliGRAM(s) IV Intermittent once  aluminum hydroxide/magnesium hydroxide/simethicone Suspension 30 milliLiter(s) Oral every 4 hours PRN  amLODIPine   Tablet 5 milliGRAM(s) Oral at bedtime  benzocaine/menthol Lozenge 1 Lozenge Oral two times a day PRN  ceFAZolin   IVPB 2000 milliGRAM(s) IV Intermittent every 8 hours  celecoxib 200 milliGRAM(s) Oral every 12 hours  isosorbide   mononitrate ER Tablet (IMDUR) 30 milliGRAM(s) Oral <User Schedule>  isosorbide   mononitrate ER Tablet (IMDUR) 60 milliGRAM(s) Oral daily  ketorolac   Injectable 30 milliGRAM(s) IV Push every 8 hours  levETIRAcetam 1000 milliGRAM(s) Oral two times a day  magnesium hydroxide Suspension 30 milliLiter(s) Oral daily PRN  melatonin 3 milliGRAM(s) Oral at bedtime PRN  metoprolol succinate ER 50 milliGRAM(s) Oral at bedtime  multivitamin 1 Tablet(s) Oral daily  ondansetron Injectable 4 milliGRAM(s) IV Push every 6 hours PRN  oxyCODONE    IR 5 milliGRAM(s) Oral every 3 hours PRN  oxyCODONE    IR 10 milliGRAM(s) Oral every 3 hours PRN  oxyCODONE    IR 15 milliGRAM(s) Oral every 4 hours PRN  pantoprazole    Tablet 40 milliGRAM(s) Oral before breakfast  polyethylene glycol 3350 17 Gram(s) Oral at bedtime  senna 2 Tablet(s) Oral at bedtime  sodium chloride 0.9%. 1000 milliLiter(s) IV Continuous <Continuous>    T(C): 36.4 (04-24-24 @ 19:00), Max: 36.9 (04-24-24 @ 07:17)  HR: 77 (04-24-24 @ 19:00) (73 - 88)  BP: 125/79 (04-24-24 @ 19:00) (98/76 - 132/85)  RR: 16 (04-24-24 @ 19:00) (16 - 24)  SpO2: 92% (04-24-24 @ 19:00) (92% - 99%)  HEENT unremarkable  neck no JVD or bruit  heart normal S1 S2 RRR no gallops or rubs  chest clear to auscultation  abd sof nontender non distended +bs  ext no calf tenderness    A/P   DVT PX  pain control  bowel regimen   wound care as per ortho  GI PX  antiemetics prn  incentive spirometer

## 2024-04-24 NOTE — DISCHARGE NOTE PROVIDER - CARE PROVIDER_API CALL
Mike Krishna.  Orthopaedic Surgery  [address] [facility], [address] [place] [id number]  Phone: [phone]  Fax: [phone]  Follow Up Time:

## 2024-04-24 NOTE — ASU PATIENT PROFILE, ADULT - NSICDXPASTMEDICALHX_GEN_ALL_CORE_FT
PAST MEDICAL HISTORY:  HLD (hyperlipidemia)     HTN (hypertension)     Seizures     Urinary retention

## 2024-04-24 NOTE — PROGRESS NOTE ADULT - SUBJECTIVE AND OBJECTIVE BOX
Patient is 68y y/o Male s/p R TKA POD#0  Patient is seen and examined at bedside.   Pt tolerated procedure well without any intra-op complications.    Pain is controlled.  Denies CP/SOB/Dizziness/N/V/D/HA.     Vital Signs Last 24 Hrs  T(C): 36.4 (24 Apr 2024 13:00), Max: 36.9 (24 Apr 2024 07:17)  T(F): 97.6 (24 Apr 2024 13:00), Max: 98.5 (24 Apr 2024 07:17)  HR: 75 (24 Apr 2024 13:00) (73 - 88)  BP: 117/75 (24 Apr 2024 13:00) (98/76 - 126/82)  BP(mean): --  RR: 16 (24 Apr 2024 13:00) (16 - 24)  SpO2: 95% (24 Apr 2024 13:00) (95% - 99%)    Parameters below as of 24 Apr 2024 13:00  Patient On (Oxygen Delivery Method): room air          PHYSICAL EXAM:  General: A&Ox3 NAD  RLE: Dressing C/D/I with ACE wrap in place. Motor intact + EHL/FHL/TA/GS.  Sensation is grossly intact.  Extremity warm, compartments soft, compressible. No calf tenderness. DP 2+   LLE: Motor intact +EHL/FHL/TA/GS. Sensation is grossly intact. Extremity warm, compartments soft, compressible. No calf tenderness. DP2+    Labs:                          14.9   8.04  )-----------( 230      ( 24 Apr 2024 10:56 )             43.2       04-24    140  |  106  |  12  ----------------------------<  119<H>  4.5   |  26  |  1.02    Ca    9.1      24 Apr 2024 10:56        A/P: Patient is a 68y y/o Male s/p R TKA, POD # 0  -wound care, knee extension/leg elevation, cryocuff, isometric exercises, new medications reviewed with pt  -Pain control/analgesia  -Inc spirometry reviewed with pt, demonstrated competence  -DVT prophylaxis with Venodynes/Aspirin 81 BID  -F/U AM Labs  -PT/OT/WBAT  -prophylactic Antibiotic  -medical consult  -DC planning     Patient is 68y y/o Male s/p R TKA POD#0  Patient is seen and examined at bedside.   Pt tolerated procedure well without any intra-op complications.    Pain is controlled.  Denies CP/SOB/Dizziness/N/V/D/HA.     Vital Signs Last 24 Hrs  T(C): 36.4 (24 Apr 2024 13:00), Max: 36.9 (24 Apr 2024 07:17)  T(F): 97.6 (24 Apr 2024 13:00), Max: 98.5 (24 Apr 2024 07:17)  HR: 75 (24 Apr 2024 13:00) (73 - 88)  BP: 117/75 (24 Apr 2024 13:00) (98/76 - 126/82)  BP(mean): --  RR: 16 (24 Apr 2024 13:00) (16 - 24)  SpO2: 95% (24 Apr 2024 13:00) (95% - 99%)    Parameters below as of 24 Apr 2024 13:00  Patient On (Oxygen Delivery Method): room air          PHYSICAL EXAM:  General: A&Ox3 NAD  RLE: Dressing C/D/I with ACE wrap in place. Motor intact + EHL/FHL/TA/GS.  Sensation is grossly intact.  Extremity warm, compartments soft, compressible. No calf tenderness. DP 2+   LLE: Motor intact +EHL/FHL/TA/GS. Sensation is grossly intact. Extremity warm, compartments soft, compressible. No calf tenderness. DP2+    Labs:                          14.9   8.04  )-----------( 230      ( 24 Apr 2024 10:56 )             43.2       04-24    140  |  106  |  12  ----------------------------<  119<H>  4.5   |  26  |  1.02    Ca    9.1      24 Apr 2024 10:56        A/P: Patient is a 68y y/o Male s/p R TKA, POD # 0  -wound care, knee extension/leg elevation, cryocuff, isometric exercises, new medications reviewed with pt  -Pain control/analgesia  -Inc spirometry reviewed with pt, demonstrated competence  -DVT prophylaxis with Venodynes/Eliquis  -F/U AM Labs  -PT/OT/WBAT  -prophylactic Antibiotic  -medical consult  -DC planning

## 2024-04-24 NOTE — PHYSICAL THERAPY INITIAL EVALUATION ADULT - GENERAL OBSERVATIONS, REHAB EVAL
Chart (EMR) reviewed. Received supine c HOB elevated, NAD.  +heplock, +dressing c ace wrap to left knee intact, +B/L SCD's donned. Alert. Ox4. Able to follow multistep commands/directions. Educated c knee precautions and provided c TKA packet.

## 2024-04-24 NOTE — PHYSICAL THERAPY INITIAL EVALUATION ADULT - BALANCE TRAINING, PT EVAL
[Menopause Age: ____] : patient was [unfilled] years old at menopause
Pt will improve standing balance(S/D) to G/G- to increase dynamic activities within 2 weeks.

## 2024-04-24 NOTE — PHYSICAL THERAPY INITIAL EVALUATION ADULT - ADDITIONAL COMMENTS
Confirmed from pre-op notes: Pt lives with wife (Who can assist post op) in a private house with 5 steps to enter with a L handrail. Once inside, the pt has 2 steps with a R handrail to reach the main floor where the bedroom and bathroom is. The pt bathroom has a tub/shower combination, fixed/retractable shower head, comfort height toilet seat and 2x grab bars. Pt was Independent c ADL's and ambulation with no device. Patient has own straight cane, rolling walker, and 3:1 commode.

## 2024-04-24 NOTE — PATIENT PROFILE ADULT - FALL HARM RISK - HARM RISK INTERVENTIONS
Assistance with ambulation/Assistance OOB with selected safe patient handling equipment/Communicate Risk of Fall with Harm to all staff/Discuss with provider need for PT consult/Monitor gait and stability/Provide patient with walking aids - walker, cane, crutches/Reinforce activity limits and safety measures with patient and family/Sit up slowly, dangle for a short time, stand at bedside before walking/Tailored Fall Risk Interventions/Use of alarms - bed, chair and/or voice tab/Visual Cue: Yellow wristband and red socks/Bed in lowest position, wheels locked, appropriate side rails in place/Call bell, personal items and telephone in reach/Instruct patient to call for assistance before getting out of bed or chair/Non-slip footwear when patient is out of bed/[place] to call system/Physically safe environment - no spills, clutter or unnecessary equipment/Purposeful Proactive Rounding/Room/bathroom lighting operational, light cord in reach

## 2024-04-24 NOTE — PHYSICAL THERAPY INITIAL EVALUATION ADULT - RANGE OF MOTION EXAMINATION, REHAB EVAL
except left knee flexion 0-70 degrees due to pain./bilateral upper extremity ROM was WFL (within functional limits)/bilateral lower extremity ROM was WFL (within functional limits)/deficits as listed below

## 2024-04-24 NOTE — DISCHARGE NOTE PROVIDER - NSDCFUSCHEDAPPT_GEN_ALL_CORE_FT
Mike Krishna  [facility]  [address] [place]   Scheduled Appointment: 04/24/2024    Mike Krishna  [facility]  [address] [facility]  Scheduled Appointment: 05/06/2024     Mike Krishna  [facility]  [address] [facility]  Scheduled Appointment: 05/06/2024

## 2024-04-24 NOTE — PATIENT PROFILE ADULT - VISION (WITH CORRECTIVE LENSES IF THE PATIENT USUALLY WEARS THEM):
Msg left on Pt's mother's cell phone for pt to call back regarding lab results. See provider's note.  Clinic number left for call back.    [place] [facility]  Clinic Health Guide   Normal vision: sees adequately in most situations; can see medication labels, newsprint

## 2024-04-24 NOTE — DISCHARGE NOTE PROVIDER - HOSPITAL COURSE
68yMale with history of OA presenting for L TKA by Dr. Krishna on 4/24/2024. Risk and benefits of surgery were explained to the patient. The patient understood and agreed to proceed with surgery. Patient underwent the procedure with no intraoperative complications. Pt was brought in stable condition to the PACU. Once stable in PACU, pt was brought to the floor. During hospital stay pt was followed by Medicine, physical therapy, Home Care during this admission. Pt had an uneventful hospital course. Pt is stable for discharge to home.

## 2024-04-24 NOTE — DISCHARGE NOTE PROVIDER - NSDCFUADDAPPT_GEN_ALL_CORE_FT
Follow up with your surgeon in two weeks. Call for appointment.    If you need more pain medications, call your surgeon's office. For medication refills or authorizations call [phone]    Make sure to have a bowel movement by 2 days after surgery. Take stool softners and laxatives as needed.    Call and schedule a follow up appointment with your primary care physician for repeat blood work (CBC and BMP) for post hospital discharge follow-up care.    Call your surgeon if you have increased redness/pain/drainage or fever. Return to ER for shortness of breath/calf tenderness.

## 2024-04-24 NOTE — PHYSICAL THERAPY INITIAL EVALUATION ADULT - GAIT TRAINING, PT EVAL
Pt will improve ambulation to ~300 feet Independently using rolling walker within 2 weeks. Pt will negotiate ~10 steps c rail Independently within 2 weeks.

## 2024-04-24 NOTE — DISCHARGE NOTE PROVIDER - NSDCFUADDINST_GEN_ALL_CORE_FT
Keep Prineo Dressing Clean, Dry and Intact. May shower with Prineo Dressing. Please do not scrub, soak, peel or pick at the prineo dressing. No creams, lotions, or oils over dressing. May shower and let water run over incision, no baths. Pat dry once out of shower. Dressing to be removed in office at follow up visit in 2 weeks. There are no staples or stitches that need to be removed.  If you notice any redness, irritation, or itching around the prineo dressing call the surgeon's office    Per Dr. Krishna: may advance from walker as tolerated per discretion of physical therapist.     Keep knee straight while at rest. Elevate the leg as much as possible ("toes above the nose") to help control swelling. Make sure you get up and take a brief walk every two hours to help with circulation and prevent stiffness. Incentive spirometer 10X/hour. Cryocuff to help with pain/inflammation.

## 2024-04-24 NOTE — OCCUPATIONAL THERAPY INITIAL EVALUATION ADULT - ADDITIONAL COMMENTS
Pre op assessment - Pt lives with wife (Who can assist post op) in a private house with 5 steps to enter with a L handrail. Once inside, the pt has 2 steps with a R handrail to reach the main floor where the bedroom and bathroom is. The pt bathroom has a tub/shower combination, fixed/retractable shower head, comfort height toilet seat and 2x grab bars.

## 2024-04-24 NOTE — PATIENT PROFILE ADULT - FUNCTIONAL ASSESSMENT - DAILY ACTIVITY ASSESSMENT TYPE
COVID-19 Screening questions    Has the patient had any close contact with a laboratory confirmed COVID-19 patient within 14 days of symptom onset.: No    Does patient have these symptoms?:  Fever greater then 100.4F lasting more than 24 hours : No  Respiratory Symptoms (cough, shortness of breath): No    Has the patient or member of household experiencing any GI symptoms: Vomiting, Nausea, Diarrhea : No  Has the patient or member of household experiencing any onset of loss of taste or smell,chills, shaking with chills, muscle pain, or headache : No      
Left voicemail.  Please confirm appointment and screen her for covid.  
Admission

## 2024-04-24 NOTE — DISCHARGE NOTE PROVIDER - NSDCMRMEDTOKEN_GEN_ALL_CORE_FT
amLODIPine 5 mg oral tablet: 1 tab(s) orally  aspirin 81 mg oral tablet: orally once a day  Eliquis 5 mg oral tablet: 1 tab(s) orally 2 times a day  isosorbide dinitrate 30 mg oral tablet: 1 tab(s) orally once a day (at bedtime)  isosorbide mononitrate 60 mg oral tablet, extended release: 1 tab(s) orally once a day  levETIRAcetam 1000 mg oral tablet: 1 tab(s) orally 2 times a day  metoprolol succinate 50 mg oral capsule, extended release: 1 cap(s) orally once a day  Zetia 10 mg oral tablet: 1 tab(s) orally once a day   amLODIPine 5 mg oral tablet: 1 tab(s) orally once a day  aspirin 81 mg oral tablet: orally once a day  celecoxib 200 mg oral capsule: 1 cap(s) orally every 12 hours x 30 days MDD: 2  Colace 100 mg oral capsule: 1 cap(s) orally 2 times a day as needed for  constipation  Eliquis 5 mg oral tablet: 1 tab(s) orally 2 times a day  isosorbide mononitrate 30 mg oral tablet, extended release: 1 tab(s) orally once a day (at bedtime)  isosorbide mononitrate 60 mg oral tablet, extended release: 1 tab(s) orally once a day  levETIRAcetam 1000 mg oral tablet: 1 tab(s) orally 2 times a day  metoprolol succinate 50 mg oral capsule, extended release: 1 cap(s) orally once a day  Multiple Vitamins oral tablet: 1 tab(s) orally once a day  Narcan 4 mg/0.1 mL nasal spray: 4 milligram(s) intranasally once , repeat as necessary.   Required with Opioid Rx  As needed. For suspected opiate overdose   Follow instructions on packet MDD: 0.2 ml  oxyCODONE 5 mg oral tablet: 1 tab(s) orally every 4 hours as needed for  moderate pain 2 tabs for severe pain MDD: 10  pantoprazole 40 mg oral delayed release tablet: 1 tab(s) orally once a day MDD: 1  Zetia 10 mg oral tablet: 1 tab(s) orally once a day

## 2024-04-25 ENCOUNTER — TRANSCRIPTION ENCOUNTER (OUTPATIENT)
Age: 69
End: 2024-04-25

## 2024-04-25 VITALS
RESPIRATION RATE: 18 BRPM | SYSTOLIC BLOOD PRESSURE: 124 MMHG | HEART RATE: 56 BPM | OXYGEN SATURATION: 95 % | DIASTOLIC BLOOD PRESSURE: 82 MMHG | TEMPERATURE: 98 F

## 2024-04-25 LAB
ANION GAP SERPL CALC-SCNC: 7 MMOL/L — SIGNIFICANT CHANGE UP (ref 5–17)
BUN SERPL-MCNC: 13 MG/DL — SIGNIFICANT CHANGE UP (ref 7–23)
CALCIUM SERPL-MCNC: 9.2 MG/DL — SIGNIFICANT CHANGE UP (ref 8.5–10.1)
CHLORIDE SERPL-SCNC: 104 MMOL/L — SIGNIFICANT CHANGE UP (ref 96–108)
CO2 SERPL-SCNC: 26 MMOL/L — SIGNIFICANT CHANGE UP (ref 22–31)
CREAT SERPL-MCNC: 1.03 MG/DL — SIGNIFICANT CHANGE UP (ref 0.5–1.3)
EGFR: 79 ML/MIN/1.73M2 — SIGNIFICANT CHANGE UP
GLUCOSE SERPL-MCNC: 124 MG/DL — HIGH (ref 70–99)
HCT VFR BLD CALC: 41.6 % — SIGNIFICANT CHANGE UP (ref 39–50)
HGB BLD-MCNC: 14.7 G/DL — SIGNIFICANT CHANGE UP (ref 13–17)
MCHC RBC-ENTMCNC: 31.8 PG — SIGNIFICANT CHANGE UP (ref 27–34)
MCHC RBC-ENTMCNC: 35.3 G/DL — SIGNIFICANT CHANGE UP (ref 32–36)
MCV RBC AUTO: 90 FL — SIGNIFICANT CHANGE UP (ref 80–100)
NRBC # BLD: 0 /100 WBCS — SIGNIFICANT CHANGE UP (ref 0–0)
PLATELET # BLD AUTO: 258 K/UL — SIGNIFICANT CHANGE UP (ref 150–400)
POTASSIUM SERPL-MCNC: 3.9 MMOL/L — SIGNIFICANT CHANGE UP (ref 3.5–5.3)
POTASSIUM SERPL-SCNC: 3.9 MMOL/L — SIGNIFICANT CHANGE UP (ref 3.5–5.3)
RBC # BLD: 4.62 M/UL — SIGNIFICANT CHANGE UP (ref 4.2–5.8)
RBC # FLD: 12.8 % — SIGNIFICANT CHANGE UP (ref 10.3–14.5)
SODIUM SERPL-SCNC: 137 MMOL/L — SIGNIFICANT CHANGE UP (ref 135–145)
WBC # BLD: 14.56 K/UL — HIGH (ref 3.8–10.5)
WBC # FLD AUTO: 14.56 K/UL — HIGH (ref 3.8–10.5)

## 2024-04-25 RX ORDER — AMLODIPINE BESYLATE 2.5 MG/1
1 TABLET ORAL
Qty: 0 | Refills: 0 | DISCHARGE

## 2024-04-25 RX ORDER — NALOXONE HYDROCHLORIDE 4 MG/.1ML
4 SPRAY NASAL
Qty: 1 | Refills: 0
Start: 2024-04-25 | End: 2024-04-25

## 2024-04-25 RX ORDER — PANTOPRAZOLE SODIUM 20 MG/1
1 TABLET, DELAYED RELEASE ORAL
Qty: 30 | Refills: 0
Start: 2024-04-25 | End: 2024-05-24

## 2024-04-25 RX ORDER — DOCUSATE SODIUM 100 MG
1 CAPSULE ORAL
Qty: 28 | Refills: 0
Start: 2024-04-25 | End: 2024-05-08

## 2024-04-25 RX ORDER — OXYCODONE HYDROCHLORIDE 5 MG/1
1 TABLET ORAL
Qty: 42 | Refills: 0
Start: 2024-04-25 | End: 2024-05-01

## 2024-04-25 RX ORDER — CELECOXIB 200 MG/1
1 CAPSULE ORAL
Qty: 60 | Refills: 0
Start: 2024-04-25 | End: 2024-05-24

## 2024-04-25 RX ADMIN — Medication 30 MILLIGRAM(S): at 11:49

## 2024-04-25 RX ADMIN — OXYCODONE HYDROCHLORIDE 10 MILLIGRAM(S): 5 TABLET ORAL at 14:10

## 2024-04-25 RX ADMIN — LEVETIRACETAM 1000 MILLIGRAM(S): 250 TABLET, FILM COATED ORAL at 06:08

## 2024-04-25 RX ADMIN — Medication 1 TABLET(S): at 11:49

## 2024-04-25 RX ADMIN — APIXABAN 2.5 MILLIGRAM(S): 2.5 TABLET, FILM COATED ORAL at 06:07

## 2024-04-25 RX ADMIN — OXYCODONE HYDROCHLORIDE 15 MILLIGRAM(S): 5 TABLET ORAL at 09:00

## 2024-04-25 RX ADMIN — PANTOPRAZOLE SODIUM 40 MILLIGRAM(S): 20 TABLET, DELAYED RELEASE ORAL at 06:07

## 2024-04-25 RX ADMIN — Medication 1000 MILLIGRAM(S): at 07:02

## 2024-04-25 RX ADMIN — Medication 100 MILLIGRAM(S): at 02:00

## 2024-04-25 RX ADMIN — CELECOXIB 200 MILLIGRAM(S): 200 CAPSULE ORAL at 06:07

## 2024-04-25 RX ADMIN — Medication 1000 MILLIGRAM(S): at 14:10

## 2024-04-25 RX ADMIN — CELECOXIB 200 MILLIGRAM(S): 200 CAPSULE ORAL at 07:07

## 2024-04-25 RX ADMIN — OXYCODONE HYDROCHLORIDE 15 MILLIGRAM(S): 5 TABLET ORAL at 08:09

## 2024-04-25 RX ADMIN — Medication 102 MILLIGRAM(S): at 06:08

## 2024-04-25 RX ADMIN — Medication 1000 MILLIGRAM(S): at 06:07

## 2024-04-25 RX ADMIN — OXYCODONE HYDROCHLORIDE 10 MILLIGRAM(S): 5 TABLET ORAL at 13:15

## 2024-04-25 RX ADMIN — OXYCODONE HYDROCHLORIDE 15 MILLIGRAM(S): 5 TABLET ORAL at 00:15

## 2024-04-25 RX ADMIN — Medication 1000 MILLIGRAM(S): at 13:15

## 2024-04-25 RX ADMIN — Medication 30 MILLIGRAM(S): at 03:15

## 2024-04-25 RX ADMIN — SODIUM CHLORIDE 100 MILLILITER(S): 9 INJECTION INTRAMUSCULAR; INTRAVENOUS; SUBCUTANEOUS at 02:50

## 2024-04-25 RX ADMIN — Medication 1000 MILLIGRAM(S): at 00:15

## 2024-04-25 RX ADMIN — Medication 30 MILLIGRAM(S): at 12:45

## 2024-04-25 RX ADMIN — Medication 30 MILLIGRAM(S): at 02:51

## 2024-04-25 NOTE — DISCHARGE NOTE NURSING/CASE MANAGEMENT/SOCIAL WORK - PATIENT PORTAL LINK FT
You can access the FollowMyHealth Patient Portal offered by [facility] by registering at the following website: [email or web address]. By joining [email or web address]’s FollowMyHealth portal, you will also be able to view your health information using other applications (apps) compatible with our system.

## 2024-04-25 NOTE — PROGRESS NOTE ADULT - SUBJECTIVE AND OBJECTIVE BOX
Patient is 68y y/o Male s/p L TKA POD#1  Patient is seen and examined at bedside.   Pt tolerated procedure well without any intra-op complications.    Pain is controlled.  Denies CP/SOB/Dizziness/N/V/D/HA.     Vital Signs Last 24 Hrs  T(C): 36.6 (25 Apr 2024 07:01), Max: 36.6 (24 Apr 2024 12:00)  T(F): 97.8 (25 Apr 2024 07:01), Max: 97.9 (24 Apr 2024 15:00)  HR: 77 (25 Apr 2024 07:01) (55 - 80)  BP: 120/79 (25 Apr 2024 07:01) (97/54 - 163/84)  BP(mean): --  RR: 18 (25 Apr 2024 07:01) (16 - 24)  SpO2: 96% (25 Apr 2024 07:01) (92% - 99%)    Parameters below as of 25 Apr 2024 03:00  Patient On (Oxygen Delivery Method): room air          PHYSICAL EXAM:  General: A&Ox3 NAD  LLE: Dressing C/D/I with ACE wrap in place. Motor intact + EHL/FHL/TA/GS.  Sensation is grossly intact.  Extremity warm, compartments soft, compressible. No calf tenderness. DP 2+   RLE: Motor intact +EHL/FHL/TA/GS. Sensation is grossly intact. Extremity warm, compartments soft, compressible. No calf tenderness. DP2+    Labs:                          14.7   14.56 )-----------( 258      ( 25 Apr 2024 06:02 )             41.6       04-25    137  |  104  |  13  ----------------------------<  124<H>  3.9   |  26  |  1.03    Ca    9.2      25 Apr 2024 06:02        A/P: Patient is a 68y y/o Male s/p LTKA, POD # 1  -wound care, knee extension/leg elevation, cryocuff, isometric exercises, new medications reviewed with pt  -Pain control/analgesia  -Inc spirometry reviewed with pt, demonstrated competence  -DVT prophylaxis with Venodynes/Eliquis  -F/U AM Labs  -PT/OT/WBAT  -prophylactic Antibiotic  -medical consult  -DC planning

## 2024-04-25 NOTE — PROGRESS NOTE ADULT - SUBJECTIVE AND OBJECTIVE BOX
DESHAUN MAYO is a 68y Male s/p ROBOTIC ASSISTED LEFT TOTAL KNEE ARTHROPLASTY WITH RUI        denies any chest pain shortness of breath palpitation dizziness lightheadedness nausea vomiting fever or chills    T(C): 36.5 (04-25-24 @ 11:00), Max: 36.6 (04-24-24 @ 15:00)  HR: 56 (04-25-24 @ 11:00) (55 - 80)  BP: 124/82 (04-25-24 @ 11:00) (97/54 - 163/84)  RR: 18 (04-25-24 @ 11:00) (16 - 18)  SpO2: 95% (04-25-24 @ 11:00) (92% - 96%)  no jvd/bruit  s1 s2 rrr  cta  s/nt/nd  no calf tend                        14.7   14.56 )-----------( 258      ( 25 Apr 2024 06:02 )             41.6   04-25    137  |  104  |  13  ----------------------------<  124<H>  3.9   |  26  |  1.03    Ca    9.2      25 Apr 2024 06:02        cont dvt px  pain control  bowel regimen  antiemetics  incentive spirometer

## 2024-04-25 NOTE — DISCHARGE NOTE NURSING/CASE MANAGEMENT/SOCIAL WORK - NSDCFUADDAPPT_GEN_ALL_CORE_FT
Follow up with your surgeon in two weeks. Call for appointment.    If you need more pain medications, call your surgeon's office. For medication refills or authorizations call [phone]    Make sure to have a bowel movement by 2 days after surgery. Take stool softners and laxatives as needed.    Call and schedule a follow up appointment with your primary care physician for repeat blood work (CBC and BMP) for post hospital discharge follow-up care.    Call your surgeon if you have increased redness/pain/drainage or fever. Return to ER for shortness of breath/calf tenderness.

## 2024-04-25 NOTE — DISCHARGE NOTE NURSING/CASE MANAGEMENT/SOCIAL WORK - NSDCPEFALRISK_GEN_ALL_CORE
For information on Fall & Injury Prevention, visit: [email or web address] OR  [email or web address] OR  https://www.cdc.gov/steadi/patient.html

## 2024-04-26 ENCOUNTER — TRANSCRIPTION ENCOUNTER (OUTPATIENT)
Age: 69
End: 2024-04-26

## 2024-04-26 LAB — SURGICAL PATHOLOGY STUDY: SIGNIFICANT CHANGE UP

## 2024-04-29 LAB
CULTURE RESULTS: NO GROWTH — SIGNIFICANT CHANGE UP
CULTURE RESULTS: NO GROWTH — SIGNIFICANT CHANGE UP
GRAM STN FLD: SIGNIFICANT CHANGE UP
GRAM STN FLD: SIGNIFICANT CHANGE UP
SPECIMEN SOURCE: SIGNIFICANT CHANGE UP
SPECIMEN SOURCE: SIGNIFICANT CHANGE UP

## 2024-05-01 ENCOUNTER — TRANSCRIPTION ENCOUNTER (OUTPATIENT)
Age: 69
End: 2024-05-01

## 2024-05-06 ENCOUNTER — APPOINTMENT (OUTPATIENT)
Dept: ORTHOPEDIC SURGERY | Facility: CLINIC | Age: 69
End: 2024-05-06
Payer: MEDICARE

## 2024-05-06 VITALS — BODY MASS INDEX: 30.8 KG/M2 | HEIGHT: 71 IN | WEIGHT: 220 LBS

## 2024-05-06 PROCEDURE — 99024 POSTOP FOLLOW-UP VISIT: CPT

## 2024-05-06 PROCEDURE — 73562 X-RAY EXAM OF KNEE 3: CPT | Mod: LT

## 2024-05-06 RX ORDER — OXYCODONE 5 MG/1
5 TABLET ORAL EVERY 4 HOURS
Qty: 30 | Refills: 0 | Status: ACTIVE | COMMUNITY
Start: 2024-05-01 | End: 1900-01-01

## 2024-05-06 NOTE — ASSESSMENT
[FreeTextEntry1] : Previous doc: 1/8/2024: Discussed treatment options for B/L Adv Knee OA.  Patient does have some complex history with a neurogenic bladder and self-catheterization history of failed stent with collaterals atrial fibrillation and on Eliquis.  Also had a history of 2 small seizures recently and is on Keppra for this.  PT has exhausted conservative treatment options in the form of PT and injection therapy. Discussed Knee TKA. PT inquired about doing both knees but discussed risks due to his medical hx  and would recommend doing 1 TKA  at a time. PT would like to proceed with L  knee TKA and   R knee TKA. Plan for B/L  Knee CT scan in compliance with RUI protocol. PT is not a candidate for NSAIDs due to being on Eliquis. Pt will obtain neurological and cardiac clearance before surgery 1/22/24: Planning for TKA in April, will try zilretta both knees today and proceed with surgery end of April.  CT reviewed.  5/6/24: PO #1. 2 weeks s/p L TKA. XR look good and has great motion and function. Cannot take NSAIDs due to blood thinners. Renewed oxy Rx but discussed I would like him to ween off narcotics. Begin outpatient PT. Does have an effusion and sig pain in the right knee- hold off on aspirating till next visit. Would like to start planning for R TKA- he will obtain CT scan RUI protocol. Follow up 4 weeks.

## 2024-05-06 NOTE — DISCUSSION/SUMMARY
[de-identified] : We discussed the patient's progress and they were reminded of their antibiotic prophylaxis.  We discussed continued physical therapy and/or a home exercise program.  Questions about their knee replacement and future follow up were answered and discussed.The incision was inspected and was clean and dry with no drainage.  The patient was instructed to call for fevers, chills, wound drainage, wound opening, redness, or any other concerns.  Entered by Vita Banda acting as a scribe.

## 2024-05-06 NOTE — IMAGING
[de-identified] : RT knee  large effusion  L knee large baker cyst L knee MJL tenderness 4/5 hip flexion  5/5 quad and hamstring  +2 PT   No pain with range of motion bilateral hips  Affected side: LEFT KNEE Incision is clean and dry with no drainage - dressing removed - Sensation intact +2 edema LE Decreased ROM: 5-110  Xray 3 views knee - Implants good position and well fixed - no fracture or dislocation

## 2024-05-07 DIAGNOSIS — I10 ESSENTIAL (PRIMARY) HYPERTENSION: ICD-10-CM

## 2024-05-07 DIAGNOSIS — M17.11 UNILATERAL PRIMARY OSTEOARTHRITIS, RIGHT KNEE: ICD-10-CM

## 2024-05-07 DIAGNOSIS — Z98.61 CORONARY ANGIOPLASTY STATUS: ICD-10-CM

## 2024-05-07 DIAGNOSIS — E78.5 HYPERLIPIDEMIA, UNSPECIFIED: ICD-10-CM

## 2024-05-07 DIAGNOSIS — I48.91 UNSPECIFIED ATRIAL FIBRILLATION: ICD-10-CM

## 2024-05-07 DIAGNOSIS — Z79.01 LONG TERM (CURRENT) USE OF ANTICOAGULANTS: ICD-10-CM

## 2024-05-07 PROBLEM — R33.9 RETENTION OF URINE, UNSPECIFIED: Chronic | Status: ACTIVE | Noted: 2024-04-04

## 2024-05-07 PROBLEM — R56.9 UNSPECIFIED CONVULSIONS: Chronic | Status: ACTIVE | Noted: 2024-04-04

## 2024-05-09 PROBLEM — I10 ESSENTIAL (PRIMARY) HYPERTENSION: Chronic | Status: ACTIVE | Noted: 2024-04-04

## 2024-05-10 ENCOUNTER — TRANSCRIPTION ENCOUNTER (OUTPATIENT)
Age: 69
End: 2024-05-10

## 2024-05-29 ENCOUNTER — APPOINTMENT (OUTPATIENT)
Dept: CT IMAGING | Facility: CLINIC | Age: 69
End: 2024-05-29
Payer: MEDICARE

## 2024-05-29 PROCEDURE — 73700 CT LOWER EXTREMITY W/O DYE: CPT | Mod: RT,MH

## 2024-06-03 ENCOUNTER — RESULT CHARGE (OUTPATIENT)
Age: 69
End: 2024-06-03

## 2024-06-03 ENCOUNTER — APPOINTMENT (OUTPATIENT)
Dept: ORTHOPEDIC SURGERY | Facility: CLINIC | Age: 69
End: 2024-06-03
Payer: MEDICARE

## 2024-06-03 VITALS — HEIGHT: 71 IN | BODY MASS INDEX: 30.8 KG/M2 | WEIGHT: 220 LBS

## 2024-06-03 DIAGNOSIS — M17.11 UNILATERAL PRIMARY OSTEOARTHRITIS, RIGHT KNEE: ICD-10-CM

## 2024-06-03 DIAGNOSIS — M17.12 UNILATERAL PRIMARY OSTEOARTHRITIS, LEFT KNEE: ICD-10-CM

## 2024-06-03 PROCEDURE — 99024 POSTOP FOLLOW-UP VISIT: CPT

## 2024-06-03 PROCEDURE — 99214 OFFICE O/P EST MOD 30 MIN: CPT | Mod: 25

## 2024-06-03 PROCEDURE — 20610 DRAIN/INJ JOINT/BURSA W/O US: CPT | Mod: RT

## 2024-06-03 RX ORDER — CELECOXIB 200 MG/1
200 CAPSULE ORAL DAILY
Qty: 30 | Refills: 0 | Status: ACTIVE | COMMUNITY
Start: 2024-06-03 | End: 1900-01-01

## 2024-06-03 NOTE — DISCUSSION/SUMMARY
[de-identified] : The patient was advised of the diagnosis.  The natural history of the pathology was explained in full to the patient in layman's terms. All questions were answered.  The risks and benefits of surgical and non-surgical treatment alternatives were explained in full to the patient.

## 2024-06-03 NOTE — IMAGING
[Left] : left knee [AP] : anteroposterior [Lateral] : lateral [[place]] : skyline [Components well fixed, in good position] : Components well fixed, in good position [de-identified] : Left knee: Inc healed.  Effusion.  ROM 3-110.  Lig stable.  NVI.  Right knee: Effusion.  Medial tenderness.  Lig stable.

## 2024-06-03 NOTE — ASSESSMENT
[FreeTextEntry1] : Previous doc: 1/8/2024: Discussed treatment options for B/L Adv Knee OA.  Patient does have some complex history with a neurogenic bladder and self-catheterization history of failed stent with collaterals atrial fibrillation and on Eliquis.  Also had a history of 2 small seizures recently and is on Keppra for this.  PT has exhausted conservative treatment options in the form of PT and injection therapy. Discussed Knee TKA. PT inquired about doing both knees but discussed risks due to his medical hx  and would recommend doing 1 TKA  at a time. PT would like to proceed with L  knee TKA and   R knee TKA. Plan for B/L  Knee CT scan in compliance with RUI protocol. PT is not a candidate for NSAIDs due to being on Eliquis. Pt will obtain neurological and cardiac clearance before surgery 1/22/24: Planning for TKA in April, will try zilretta both knees today and proceed with surgery end of April.  CT reviewed. 5/6/24: PO #1. 2 weeks s/p L TKA. XR look good and has great motion and function. Cannot take NSAIDs due to blood thinners. Renewed oxy Rx but discussed I would like him to ween off narcotics. Begin outpatient PT. Does have an effusion and sig pain in the right knee- hold off on aspirating till next visit. Would like to start planning for R TKA- he will obtain CT scan RUI protocol. Follow up 4 weeks.   6/3/24: Left TKA doing well, cont PT.  Right knee cont pain, planning for TKA in September.  Will give one more zilretta inj today, tolerated well.
124

## 2024-06-03 NOTE — HISTORY OF PRESENT ILLNESS
[10] : 10 [7] : 7 [Dull/Aching] : dull/aching [Localized] : localized [Sharp] : sharp [Stabbing] : stabbing [Throbbing] : throbbing [Constant] : constant [Rest] : rest [Meds] : meds [Physical therapy] : physical therapy [Walking] : walking [Stairs] : stairs [Retired] : Work status: retired [de-identified] : 6/3/24: 6 weeks s/p left TKA, improving.  Still some warmth in knee.  Pain after PT.  No fevers/chills.  Worsening right knee pain, planning for TKA end of September.  Previous doc: 1/8/24: Prev seen by Martín and reports B/L knee pain. Had prev aspiration last month. Pt expresses 2 weeks of relief from previous injection therapy. Pt has stent and hx of  ablations and seizures. Pt is on Kepra and Eliquis. Pt has neurological and bladder medical complications and Cerasis.    1/22/24: Worsening pain, had CT both knees done and scheduled for TKA April 24. 5/6/24: PO #1. 2 weeks s/p L TKA. Currently doing PT- feels it is beneficial, making progression. Taking 4-5 oxy a day with good relief, states advil works well but unable to take due to blood thinners. Most pain posteriorly. Using cane.  [] : no [FreeTextEntry1] : right knee [FreeTextEntry5] : patient left knee has gotten worse, due to favoring the left knee after surgery  [FreeTextEntry6] : swelling  [de-identified] : due to favoring left knee [de-identified] : PT [de-identified] : 4/24/24 [de-identified] : LT TKA

## 2024-06-03 NOTE — PROCEDURE
[Large Joint Injection] : Large joint injection [Right] : of the right [Knee] : knee [Pain] : pain [Inflammation] : inflammation [X-ray evidence of Osteoarthritis on this or prior visit] : x-ray evidence of Osteoarthritis on this or prior visit [Betadine] : betadine [Ethyl Chloride sprayed topically] : ethyl chloride sprayed topically [Sterile technique used] : sterile technique used [___ cc    32 units 5mg] : Zilretta ~Vcc of 32 units 5 mg  [] : Patient tolerated procedure well [Call if redness, pain or fever occur] : call if redness, pain or fever occur [Apply ice for 15min out of every hour for the next 12-24 hours as tolerated] : apply ice for 15 minutes out of every hour for the next 12-24 hours as tolerated [Previous OTC use and PT nontherapeutic] : patient has tried OTC's including aspirin, Ibuprofen, Aleve, etc or prescription NSAIDS, and/or exercises at home and/or physical therapy without satisfactory response [Patient had decreased mobility in the joint] : patient had decreased mobility in the joint [Risks, benefits, alternatives discussed / Verbal consent obtained] : the risks benefits, and alternatives have been discussed, and verbal consent was obtained

## 2024-06-17 ENCOUNTER — APPOINTMENT (OUTPATIENT)
Dept: ORTHOPEDIC SURGERY | Facility: CLINIC | Age: 69
End: 2024-06-17

## 2024-06-21 ENCOUNTER — TRANSCRIPTION ENCOUNTER (OUTPATIENT)
Age: 69
End: 2024-06-21

## 2024-06-21 ENCOUNTER — NON-APPOINTMENT (OUTPATIENT)
Age: 69
End: 2024-06-21

## 2024-07-01 ENCOUNTER — APPOINTMENT (OUTPATIENT)
Dept: ORTHOPEDIC SURGERY | Facility: CLINIC | Age: 69
End: 2024-07-01
Payer: MEDICARE

## 2024-07-01 VITALS — HEIGHT: 71 IN | WEIGHT: 220 LBS | BODY MASS INDEX: 30.8 KG/M2

## 2024-07-01 DIAGNOSIS — Z96.652 PRESENCE OF LEFT ARTIFICIAL KNEE JOINT: ICD-10-CM

## 2024-07-01 PROCEDURE — 99024 POSTOP FOLLOW-UP VISIT: CPT

## 2024-07-01 PROCEDURE — 20610 DRAIN/INJ JOINT/BURSA W/O US: CPT

## 2024-07-02 LAB
B PERT IGG+IGM PNL SER: ABNORMAL
COLOR FLD: NORMAL
FLUID INTAKE SUBSTANCE CLASS: NORMAL
JOINT PCR PANEL: NOT DETECTED
JOINT PCR SOURCE: NORMAL
MONOS+MACROS NFR FLD MANUAL: 25 %
NEUTS SEG # FLD MANUAL: 75 %
RBC # FLD MANUAL: ABNORMAL /UL
SYCRY CLARITY: ABNORMAL
SYCRY COLOR: ABNORMAL
SYCRY ID: ABNORMAL
SYCRY TUBE: NORMAL
TOTAL CELLS COUNTED FLD: 127 /UL
TUBE TYPE: NORMAL

## 2024-07-16 LAB — JOINT CULTURE: NORMAL

## 2024-08-24 ENCOUNTER — NON-APPOINTMENT (OUTPATIENT)
Age: 69
End: 2024-08-24

## 2024-09-09 ENCOUNTER — OUTPATIENT (OUTPATIENT)
Dept: OUTPATIENT SERVICES | Facility: HOSPITAL | Age: 69
LOS: 1 days | Discharge: HOME | End: 2024-09-09

## 2024-09-09 VITALS
WEIGHT: 210.98 LBS | HEIGHT: 71 IN | HEART RATE: 86 BPM | OXYGEN SATURATION: 98 % | RESPIRATION RATE: 18 BRPM | SYSTOLIC BLOOD PRESSURE: 148 MMHG | TEMPERATURE: 98 F | DIASTOLIC BLOOD PRESSURE: 91 MMHG

## 2024-09-09 DIAGNOSIS — I25.10 ATHEROSCLEROTIC HEART DISEASE OF NATIVE CORONARY ARTERY WITHOUT ANGINA PECTORIS: ICD-10-CM

## 2024-09-09 DIAGNOSIS — Z01.818 ENCOUNTER FOR OTHER PREPROCEDURAL EXAMINATION: ICD-10-CM

## 2024-09-09 DIAGNOSIS — I10 ESSENTIAL (PRIMARY) HYPERTENSION: ICD-10-CM

## 2024-09-09 DIAGNOSIS — Z98.890 OTHER SPECIFIED POSTPROCEDURAL STATES: Chronic | ICD-10-CM

## 2024-09-09 DIAGNOSIS — M17.11 UNILATERAL PRIMARY OSTEOARTHRITIS, RIGHT KNEE: ICD-10-CM

## 2024-09-09 LAB
A1C WITH ESTIMATED AVERAGE GLUCOSE RESULT: 5.2 % — SIGNIFICANT CHANGE UP (ref 4–5.6)
ALBUMIN SERPL ELPH-MCNC: 4 G/DL — SIGNIFICANT CHANGE UP (ref 3.3–5)
ALP SERPL-CCNC: 63 U/L — SIGNIFICANT CHANGE UP (ref 40–120)
ALT FLD-CCNC: 27 U/L — SIGNIFICANT CHANGE UP (ref 12–78)
ANION GAP SERPL CALC-SCNC: 7 MMOL/L — SIGNIFICANT CHANGE UP (ref 5–17)
APTT BLD: 32.5 SEC — SIGNIFICANT CHANGE UP (ref 24.5–35.6)
AST SERPL-CCNC: 14 U/L — LOW (ref 15–37)
BASOPHILS # BLD AUTO: 0.07 K/UL — SIGNIFICANT CHANGE UP (ref 0–0.2)
BASOPHILS NFR BLD AUTO: 0.7 % — SIGNIFICANT CHANGE UP (ref 0–2)
BILIRUB SERPL-MCNC: 1.4 MG/DL — HIGH (ref 0.2–1.2)
BUN SERPL-MCNC: 10 MG/DL — SIGNIFICANT CHANGE UP (ref 7–23)
CALCIUM SERPL-MCNC: 10 MG/DL — SIGNIFICANT CHANGE UP (ref 8.5–10.1)
CHLORIDE SERPL-SCNC: 100 MMOL/L — SIGNIFICANT CHANGE UP (ref 96–108)
CO2 SERPL-SCNC: 29 MMOL/L — SIGNIFICANT CHANGE UP (ref 22–31)
CREAT SERPL-MCNC: 0.97 MG/DL — SIGNIFICANT CHANGE UP (ref 0.5–1.3)
EGFR: 85 ML/MIN/1.73M2 — SIGNIFICANT CHANGE UP
EOSINOPHIL # BLD AUTO: 0.07 K/UL — SIGNIFICANT CHANGE UP (ref 0–0.5)
EOSINOPHIL NFR BLD AUTO: 0.7 % — SIGNIFICANT CHANGE UP (ref 0–6)
ESTIMATED AVERAGE GLUCOSE: 103 MG/DL — SIGNIFICANT CHANGE UP (ref 68–114)
GLUCOSE SERPL-MCNC: 107 MG/DL — HIGH (ref 70–99)
HCT VFR BLD CALC: 47.8 % — SIGNIFICANT CHANGE UP (ref 39–50)
HGB BLD-MCNC: 16.5 G/DL — SIGNIFICANT CHANGE UP (ref 13–17)
IMM GRANULOCYTES NFR BLD AUTO: 0.9 % — SIGNIFICANT CHANGE UP (ref 0–0.9)
INR BLD: 0.97 RATIO — SIGNIFICANT CHANGE UP (ref 0.85–1.18)
LYMPHOCYTES # BLD AUTO: 2.02 K/UL — SIGNIFICANT CHANGE UP (ref 1–3.3)
LYMPHOCYTES # BLD AUTO: 20.3 % — SIGNIFICANT CHANGE UP (ref 13–44)
MCHC RBC-ENTMCNC: 31.1 PG — SIGNIFICANT CHANGE UP (ref 27–34)
MCHC RBC-ENTMCNC: 34.5 G/DL — SIGNIFICANT CHANGE UP (ref 32–36)
MCV RBC AUTO: 90 FL — SIGNIFICANT CHANGE UP (ref 80–100)
MONOCYTES # BLD AUTO: 1.04 K/UL — HIGH (ref 0–0.9)
MONOCYTES NFR BLD AUTO: 10.4 % — SIGNIFICANT CHANGE UP (ref 2–14)
MRSA PCR RESULT.: SIGNIFICANT CHANGE UP
NEUTROPHILS # BLD AUTO: 6.68 K/UL — SIGNIFICANT CHANGE UP (ref 1.8–7.4)
NEUTROPHILS NFR BLD AUTO: 67 % — SIGNIFICANT CHANGE UP (ref 43–77)
NRBC # BLD: 0 /100 WBCS — SIGNIFICANT CHANGE UP (ref 0–0)
PLATELET # BLD AUTO: 394 K/UL — SIGNIFICANT CHANGE UP (ref 150–400)
POTASSIUM SERPL-MCNC: 3.8 MMOL/L — SIGNIFICANT CHANGE UP (ref 3.5–5.3)
POTASSIUM SERPL-SCNC: 3.8 MMOL/L — SIGNIFICANT CHANGE UP (ref 3.5–5.3)
PROT SERPL-MCNC: 8.1 GM/DL — SIGNIFICANT CHANGE UP (ref 6–8.3)
PROTHROM AB SERPL-ACNC: 11.7 SEC — SIGNIFICANT CHANGE UP (ref 9.5–13)
RBC # BLD: 5.31 M/UL — SIGNIFICANT CHANGE UP (ref 4.2–5.8)
RBC # FLD: 11.9 % — SIGNIFICANT CHANGE UP (ref 10.3–14.5)
S AUREUS DNA NOSE QL NAA+PROBE: SIGNIFICANT CHANGE UP
SODIUM SERPL-SCNC: 136 MMOL/L — SIGNIFICANT CHANGE UP (ref 135–145)
WBC # BLD: 9.97 K/UL — SIGNIFICANT CHANGE UP (ref 3.8–10.5)
WBC # FLD AUTO: 9.97 K/UL — SIGNIFICANT CHANGE UP (ref 3.8–10.5)

## 2024-09-09 RX ORDER — SODIUM CHLORIDE 9 MG/ML
3 INJECTION INTRAMUSCULAR; INTRAVENOUS; SUBCUTANEOUS EVERY 8 HOURS
Refills: 0 | Status: DISCONTINUED | OUTPATIENT
Start: 2024-09-18 | End: 2024-09-19

## 2024-09-09 NOTE — OCCUPATIONAL THERAPY INITIAL EVALUATION ADULT - DIAGNOSIS, OT EVAL
muscle weakness, decreased ROM, ADL management, and functional mobility
Muscle weakness M68.2; deficits in balance, coordination, endurance, ADL tasks & functional mobility

## 2024-09-09 NOTE — OCCUPATIONAL THERAPY INITIAL EVALUATION ADULT - COORDINATION ASSESSED, REHAB EVAL
intact in BUE; limited in BLE/finger to nose/heel to shin
intact in BUE/LLE; limited in RLE due to pain/finger to nose/heel to shin

## 2024-09-09 NOTE — OCCUPATIONAL THERAPY INITIAL EVALUATION ADULT - PRECAUTIONS/LIMITATIONS, REHAB EVAL
fall precautions/surgical precautions
Pt's mobility and ADL management is limited due to pain in right knee ./fall precautions

## 2024-09-09 NOTE — OCCUPATIONAL THERAPY INITIAL EVALUATION ADULT - PATIENT/FAMILY/SIGNIFICANT OTHER GOALS STATEMENT, OT EVAL
Pt's goal is to improve mobility and overall quality of life.
Pt's goals are to improve mobility and quality of life without pain

## 2024-09-09 NOTE — OCCUPATIONAL THERAPY INITIAL EVALUATION ADULT - PERTINENT HX OF CURRENT PROBLEM, REHAB EVAL
Pt is 70 y/o male slated for elective surgery for right TKR with MD Krishna on 9/18/24  due to OA, chronic pain and DJD. Pt reported buckling in his right, but denied any  falls in the past 3-6 months. Pt has left TKR in this facility in April 2024 and was discharged home with rehab service. Pt is 68 y/o male slated for elective surgery for right TKR with MD Krishna on 9/18/24  due to OA, chronic pain and DJD. Pt reported buckling in his right, but denied any falls in the past 3-6 months. Pt had left TKR in this facility in April 2024 and was discharged home with rehab service.

## 2024-09-09 NOTE — H&P PST ADULT - NSICDXPASTMEDICALHX_GEN_ALL_CORE_FT
PAST MEDICAL HISTORY:  Arteriosclerotic heart disease (ASHD)     HLD (hyperlipidemia)     HTN (hypertension)     Seizures     Urinary retention

## 2024-09-09 NOTE — H&P PST ADULT - NSICDXFAMILYHX_GEN_ALL_CORE_FT
FAMILY HISTORY:  Father  Still living? Unknown  FH: heart attack, Age at diagnosis: Age Unknown    Mother  Still living? No  Family history of cervical cancer, Age at diagnosis: Age Unknown  FH: heart attack, Age at diagnosis: Age Unknown

## 2024-09-09 NOTE — H&P PST ADULT - HISTORY OF PRESENT ILLNESS
69 yo male , pmh- htn, ashd, seizure x 1 in 2016, cad, cardiac stent in 2016 remains on eliquis , c/o  right knee pain 2/2 osteoarthritis - scheduled for  right knee arthroplasty  goal: to walk without pain   denies recent travels in the past 30 days. No fever, SOB, cough, flu like symptoms or body rash- covid screen

## 2024-09-09 NOTE — OCCUPATIONAL THERAPY INITIAL EVALUATION ADULT - LIVES WITH, PROFILE
Pt lives with wife in a private house with 5 steps to enter with a L handrail. Once inside, the pt has 2 steps with a R handrail to reach the main floor where the bedroom and bathroom is. The pt bathroom has a tub/shower combination, fixed/retractable shower head, comfort height toilet seat and 4x grab bars inside the shower and next to the toilet,. Pt's toilet has adequate space to fit a commode over it.
Pt lives with his wife in a private house with 5 entry steps equipped with bilateral hand rails that cannot be reached simultaneously. All living amenities are located on the first floor. The bathroom has a tub and shower combination with two grab bars, retractable shower head, and standard toilet.

## 2024-09-09 NOTE — OCCUPATIONAL THERAPY INITIAL EVALUATION ADULT - RANGE OF MOTION EXAMINATION, LOWER EXTREMITY
ROM is limited in right knee due to pain . ROM in right knee is 0-95 degrees/Left LE Active ROM was WFL (within functional limits)/Left LE Passive ROM was WFL (w/i functional limits)/Right LE Passive ROM was WFL  (within functional limits)/Right LE Active Assistive ROM was WFL  (within functional limits)
ROM is limited in left knee due to pain./Left LE Passive ROM was WFL (w/i functional limits)/Left LE Active Assistive ROM was WFL (within functional limits)/Right LE Active ROM was WFL   (within functional limits)

## 2024-09-09 NOTE — H&P PST ADULT - PROBLEM SELECTOR PLAN 4
Assessment and Plan: labs - cbc,pt/ptt,bmp,t&s,nose cx,ekg  M/C required, cardio and neurologist   preop 3 day hibiclens instruction reviewed and given .instructed on if  nose cx positive use mupuricin 5 days and checklist given  take routine meds DOS with sips of water. avoid NSAID and OTC supplements. verbalized understanding  information on proper nutrition , increase protein and better food choices provided in packet  ensure clear  anesthesiologist to review pst labs, ekg, medical clearances and optimization for surgery

## 2024-09-09 NOTE — OCCUPATIONAL THERAPY INITIAL EVALUATION ADULT - RANGE OF MOTION EXAMINATION, UPPER EXTREMITY
bilateral UE Active ROM was WFL  (within functional limits)
bilateral UE Active ROM was WNL (within normal limits)/bilateral UE Passive ROM was WNL (within normal limits)

## 2024-09-09 NOTE — OCCUPATIONAL THERAPY INITIAL EVALUATION ADULT - ADDITIONAL COMMENTS
At this time, pt is functioning in his roles, self sufficient, driving & ambulating independently at home  a rolling walker and 3:1 commode from prior left TKR. Pt c/o 7/10 pain in his right knee. The pain is exacerbated, by walking, prolonged standing, negotiating steps and is relieved with rest. Pt denied any adverse reactions to medication. Pt is right hand dominant and wears glasses for reading. At this time, pt is functioning in his roles, self sufficient, driving & ambulating independently at home without any adaptive device, but uses a SAC in the community. Pt owns a rolling walker and 3:1 commode from prior left TKR.  Pt has an antalgic gait. Pt c/o 7/10 pain in his right knee. The pain is exacerbated, by walking, prolonged standing, negotiating steps and is relieved with rest. Pt denied any adverse reactions to medication. Pt is right hand dominant and wears glasses for reading.

## 2024-09-09 NOTE — OCCUPATIONAL THERAPY INITIAL EVALUATION ADULT - SOCIAL CONCERNS
Pt voiced concerns about his recovery at home. Pt endorsed that his spouse will be able to assist him after he is discharged home post-operatively./Complex psychosocial needs/coping issues
Pt voiced concerns about his recovery at home. Pt will be supported by his wife after he is discharged home post-operatively./Complex psychosocial needs/coping issues

## 2024-09-09 NOTE — OCCUPATIONAL THERAPY INITIAL EVALUATION ADULT - PERTINENT HX OF CURRENT PROBLEM, REHAB EVAL
Pt is a 73 y/o male slated for elective surgery for left TKR due to OA, pain and DJD on 9/27/24 with Dr. Barone. Pt underwent right TKR in October of 2023. Pt is not currently receiving outpatient PT services. Pt denies any buckling or recent falls.

## 2024-09-09 NOTE — OCCUPATIONAL THERAPY INITIAL EVALUATION ADULT - NSOTDISCHREC_GEN_A_CORE
postoperatively to prevent falls, optimize pt's ability for ADL management & safely navigate in all terrains . Pt has a SAC, rolling walker and 3:1 commode ./Home OT
Home OT post-operatively to prevent falls, improve balance, muscle strength, and endurance in order for the pt to perform ADL management and functional mobility in a safe manner.

## 2024-09-09 NOTE — OCCUPATIONAL THERAPY INITIAL EVALUATION ADULT - ADDITIONAL COMMENTS
At this time, pt is functioning in his role, driving and ambulating independently without the use of an ambulatory device. Pt has an antalgic gait. Pt owns a rolling walker, cane, and 3/1 commode from previous TKR in October 2023; all are in good condition and easily accessible. Pt complains of 3/10 pain at rest which is exacerbated by walking prolonged standing and walking and is relieved with rest. Pt denied any adverse reactions to medication. Pt is right hand dominant and wears glasses for both distance and reading.

## 2024-09-10 LAB — VIT D25+D1,25 OH+D1,25 PNL SERPL-MCNC: 68.9 PG/ML — SIGNIFICANT CHANGE UP (ref 19.9–79.3)

## 2024-09-13 ENCOUNTER — NON-APPOINTMENT (OUTPATIENT)
Age: 69
End: 2024-09-13

## 2024-09-17 ENCOUNTER — TRANSCRIPTION ENCOUNTER (OUTPATIENT)
Age: 69
End: 2024-09-17

## 2024-09-18 ENCOUNTER — TRANSCRIPTION ENCOUNTER (OUTPATIENT)
Age: 69
End: 2024-09-18

## 2024-09-18 ENCOUNTER — INPATIENT (INPATIENT)
Facility: HOSPITAL | Age: 69
LOS: 0 days | Discharge: HOME HEALTH | End: 2024-09-19
Attending: ORTHOPAEDIC SURGERY | Admitting: ORTHOPAEDIC SURGERY
Payer: MEDICARE

## 2024-09-18 ENCOUNTER — RESULT REVIEW (OUTPATIENT)
Age: 69
End: 2024-09-18

## 2024-09-18 ENCOUNTER — APPOINTMENT (OUTPATIENT)
Dept: ORTHOPEDIC SURGERY | Facility: HOSPITAL | Age: 69
End: 2024-09-18

## 2024-09-18 VITALS
WEIGHT: 216.05 LBS | TEMPERATURE: 97 F | SYSTOLIC BLOOD PRESSURE: 133 MMHG | OXYGEN SATURATION: 98 % | HEART RATE: 84 BPM | DIASTOLIC BLOOD PRESSURE: 93 MMHG | RESPIRATION RATE: 18 BRPM | HEIGHT: 71 IN

## 2024-09-18 DIAGNOSIS — Z98.890 OTHER SPECIFIED POSTPROCEDURAL STATES: Chronic | ICD-10-CM

## 2024-09-18 DIAGNOSIS — Z96.652 PRESENCE OF LEFT ARTIFICIAL KNEE JOINT: Chronic | ICD-10-CM

## 2024-09-18 LAB
ANION GAP SERPL CALC-SCNC: 5 MMOL/L — SIGNIFICANT CHANGE UP (ref 5–17)
BUN SERPL-MCNC: 10 MG/DL — SIGNIFICANT CHANGE UP (ref 7–23)
CALCIUM SERPL-MCNC: 9.2 MG/DL — SIGNIFICANT CHANGE UP (ref 8.5–10.1)
CHLORIDE SERPL-SCNC: 105 MMOL/L — SIGNIFICANT CHANGE UP (ref 96–108)
CO2 SERPL-SCNC: 28 MMOL/L — SIGNIFICANT CHANGE UP (ref 22–31)
CREAT SERPL-MCNC: 0.97 MG/DL — SIGNIFICANT CHANGE UP (ref 0.5–1.3)
EGFR: 85 ML/MIN/1.73M2 — SIGNIFICANT CHANGE UP
GLUCOSE BLDC GLUCOMTR-MCNC: 109 MG/DL — HIGH (ref 70–99)
GLUCOSE SERPL-MCNC: 118 MG/DL — HIGH (ref 70–99)
HCT VFR BLD CALC: 39.9 % — SIGNIFICANT CHANGE UP (ref 39–50)
HGB BLD-MCNC: 13.8 G/DL — SIGNIFICANT CHANGE UP (ref 13–17)
MCHC RBC-ENTMCNC: 30.9 PG — SIGNIFICANT CHANGE UP (ref 27–34)
MCHC RBC-ENTMCNC: 34.6 G/DL — SIGNIFICANT CHANGE UP (ref 32–36)
MCV RBC AUTO: 89.5 FL — SIGNIFICANT CHANGE UP (ref 80–100)
NRBC # BLD: 0 /100 WBCS — SIGNIFICANT CHANGE UP (ref 0–0)
PLATELET # BLD AUTO: 322 K/UL — SIGNIFICANT CHANGE UP (ref 150–400)
POTASSIUM SERPL-MCNC: 4.5 MMOL/L — SIGNIFICANT CHANGE UP (ref 3.5–5.3)
POTASSIUM SERPL-SCNC: 4.5 MMOL/L — SIGNIFICANT CHANGE UP (ref 3.5–5.3)
RBC # BLD: 4.46 M/UL — SIGNIFICANT CHANGE UP (ref 4.2–5.8)
RBC # FLD: 11.9 % — SIGNIFICANT CHANGE UP (ref 10.3–14.5)
SODIUM SERPL-SCNC: 138 MMOL/L — SIGNIFICANT CHANGE UP (ref 135–145)
WBC # BLD: 11.47 K/UL — HIGH (ref 3.8–10.5)
WBC # FLD AUTO: 11.47 K/UL — HIGH (ref 3.8–10.5)

## 2024-09-18 PROCEDURE — 88305 TISSUE EXAM BY PATHOLOGIST: CPT | Mod: 26

## 2024-09-18 PROCEDURE — 88311 DECALCIFY TISSUE: CPT | Mod: 26

## 2024-09-18 PROCEDURE — 20985 CPTR-ASST DIR MS PX: CPT | Mod: AS

## 2024-09-18 PROCEDURE — 73560 X-RAY EXAM OF KNEE 1 OR 2: CPT | Mod: 26,RT

## 2024-09-18 PROCEDURE — 27447 TOTAL KNEE ARTHROPLASTY: CPT | Mod: RT

## 2024-09-18 PROCEDURE — 27447 TOTAL KNEE ARTHROPLASTY: CPT | Mod: AS,RT

## 2024-09-18 PROCEDURE — 20985 CPTR-ASST DIR MS PX: CPT

## 2024-09-18 DEVICE — PATELLA ASYMMETRIC 38MM TRIATHLON: Type: IMPLANTABLE DEVICE | Site: RIGHT | Status: FUNCTIONAL

## 2024-09-18 DEVICE — MAKO BONE PIN 3.2MM X 140MM: Type: IMPLANTABLE DEVICE | Site: RIGHT | Status: FUNCTIONAL

## 2024-09-18 DEVICE — COMP FEM CR CMNTLSS BEADED W/ PA SZ 6 RT: Type: IMPLANTABLE DEVICE | Site: RIGHT | Status: FUNCTIONAL

## 2024-09-18 DEVICE — MAKO BONE PIN 3.2MM X 110MM: Type: IMPLANTABLE DEVICE | Site: RIGHT | Status: FUNCTIONAL

## 2024-09-18 DEVICE — BASEPLATE TIB TRIATHLON TRITAN SZ 6: Type: IMPLANTABLE DEVICE | Site: RIGHT | Status: FUNCTIONAL

## 2024-09-18 DEVICE — INSERT TIB BEARING CS X3 SZ 6 9MM: Type: IMPLANTABLE DEVICE | Site: RIGHT | Status: FUNCTIONAL

## 2024-09-18 RX ORDER — DEXAMETHASONE 0.75 MG
10 TABLET ORAL ONCE
Refills: 0 | Status: COMPLETED | OUTPATIENT
Start: 2024-09-19 | End: 2024-09-19

## 2024-09-18 RX ORDER — ISOSORBIDE MONONITRATE 30 MG/1
30 TABLET, EXTENDED RELEASE ORAL AT BEDTIME
Refills: 0 | Status: DISCONTINUED | OUTPATIENT
Start: 2024-09-18 | End: 2024-09-19

## 2024-09-18 RX ORDER — CEFAZOLIN SODIUM 2 G/100ML
2000 INJECTION, SOLUTION INTRAVENOUS EVERY 8 HOURS
Refills: 0 | Status: COMPLETED | OUTPATIENT
Start: 2024-09-18 | End: 2024-09-19

## 2024-09-18 RX ORDER — CELECOXIB 400 MG/1
200 CAPSULE ORAL EVERY 12 HOURS
Refills: 0 | Status: DISCONTINUED | OUTPATIENT
Start: 2024-09-19 | End: 2024-09-19

## 2024-09-18 RX ORDER — CELECOXIB 400 MG/1
200 CAPSULE ORAL ONCE
Refills: 0 | Status: COMPLETED | OUTPATIENT
Start: 2024-09-18 | End: 2024-09-18

## 2024-09-18 RX ORDER — POLYETHYLENE GLYCOL 3350 17 G/17G
17 POWDER, FOR SOLUTION ORAL AT BEDTIME
Refills: 0 | Status: DISCONTINUED | OUTPATIENT
Start: 2024-09-18 | End: 2024-09-19

## 2024-09-18 RX ORDER — LEVETIRACETAM 1000 MG/1
1000 TABLET ORAL
Refills: 0 | Status: DISCONTINUED | OUTPATIENT
Start: 2024-09-18 | End: 2024-09-19

## 2024-09-18 RX ORDER — CELECOXIB 400 MG/1
1 CAPSULE ORAL
Qty: 60 | Refills: 0
Start: 2024-09-18 | End: 2024-10-17

## 2024-09-18 RX ORDER — FLU VACCINE TS 2012-2013(5YR+) 45MCG/.5ML
0.5 VIAL (ML) INTRAMUSCULAR ONCE
Refills: 0 | Status: COMPLETED | OUTPATIENT
Start: 2024-09-18 | End: 2024-09-18

## 2024-09-18 RX ORDER — APIXABAN 5 MG/1
2.5 TABLET, FILM COATED ORAL
Refills: 0 | Status: DISCONTINUED | OUTPATIENT
Start: 2024-09-19 | End: 2024-09-19

## 2024-09-18 RX ORDER — DOCUSATE CALCIUM 240 MG/1
1 CAPSULE ORAL
Qty: 28 | Refills: 0
Start: 2024-09-18 | End: 2024-10-01

## 2024-09-18 RX ORDER — OXYCODONE HYDROCHLORIDE 5 MG/1
15 TABLET ORAL EVERY 4 HOURS
Refills: 0 | Status: DISCONTINUED | OUTPATIENT
Start: 2024-09-18 | End: 2024-09-19

## 2024-09-18 RX ORDER — ISOSORBIDE MONONITRATE 30 MG/1
60 TABLET, EXTENDED RELEASE ORAL DAILY
Refills: 0 | Status: DISCONTINUED | OUTPATIENT
Start: 2024-09-18 | End: 2024-09-19

## 2024-09-18 RX ORDER — METOPROLOL TARTRATE 100 MG/1
50 TABLET ORAL DAILY
Refills: 0 | Status: DISCONTINUED | OUTPATIENT
Start: 2024-09-18 | End: 2024-09-19

## 2024-09-18 RX ORDER — EZETIMIBE 10 MG/1
10 TABLET ORAL DAILY
Refills: 0 | Status: DISCONTINUED | OUTPATIENT
Start: 2024-09-18 | End: 2024-09-19

## 2024-09-18 RX ORDER — AMLODIPINE BESYLATE 10 MG/1
5 TABLET ORAL DAILY
Refills: 0 | Status: DISCONTINUED | OUTPATIENT
Start: 2024-09-18 | End: 2024-09-19

## 2024-09-18 RX ORDER — SENNA 187 MG
2 TABLET ORAL AT BEDTIME
Refills: 0 | Status: DISCONTINUED | OUTPATIENT
Start: 2024-09-18 | End: 2024-09-19

## 2024-09-18 RX ORDER — ACETAMINOPHEN 325 MG/1
1000 TABLET ORAL ONCE
Refills: 0 | Status: DISCONTINUED | OUTPATIENT
Start: 2024-09-18 | End: 2024-09-19

## 2024-09-18 RX ORDER — ACETAMINOPHEN 325 MG/1
1000 TABLET ORAL ONCE
Refills: 0 | Status: COMPLETED | OUTPATIENT
Start: 2024-09-18 | End: 2024-09-18

## 2024-09-18 RX ORDER — OXYCODONE HYDROCHLORIDE 5 MG/1
1 TABLET ORAL
Qty: 42 | Refills: 0
Start: 2024-09-18 | End: 2024-09-24

## 2024-09-18 RX ORDER — AMOXICILLIN AND CLAVULANATE POTASSIUM 250; 125 MG/1; MG/1
1 TABLET, FILM COATED ORAL
Refills: 0 | DISCHARGE

## 2024-09-18 RX ORDER — ONDANSETRON 2 MG/ML
4 INJECTION, SOLUTION INTRAMUSCULAR; INTRAVENOUS EVERY 6 HOURS
Refills: 0 | Status: DISCONTINUED | OUTPATIENT
Start: 2024-09-18 | End: 2024-09-19

## 2024-09-18 RX ORDER — SODIUM CHLORIDE 9 MG/ML
500 INJECTION INTRAMUSCULAR; INTRAVENOUS; SUBCUTANEOUS ONCE
Refills: 0 | Status: COMPLETED | OUTPATIENT
Start: 2024-09-18 | End: 2024-09-18

## 2024-09-18 RX ORDER — ACETAMINOPHEN 325 MG/1
650 TABLET ORAL ONCE
Refills: 0 | Status: COMPLETED | OUTPATIENT
Start: 2024-09-18 | End: 2024-09-18

## 2024-09-18 RX ORDER — AMOXICILLIN AND CLAVULANATE POTASSIUM 250; 125 MG/1; MG/1
1 TABLET, FILM COATED ORAL
Refills: 0 | Status: DISCONTINUED | OUTPATIENT
Start: 2024-09-19 | End: 2024-09-19

## 2024-09-18 RX ORDER — FENTANYL CITRATE 50 UG/ML
50 INJECTION INTRAMUSCULAR; INTRAVENOUS ONCE
Refills: 0 | Status: DISCONTINUED | OUTPATIENT
Start: 2024-09-18 | End: 2024-09-18

## 2024-09-18 RX ORDER — PANTOPRAZOLE SODIUM 40 MG
1 TABLET, DELAYED RELEASE (ENTERIC COATED) ORAL
Qty: 30 | Refills: 0
Start: 2024-09-18 | End: 2024-10-17

## 2024-09-18 RX ORDER — ACETAMINOPHEN 325 MG/1
1000 TABLET ORAL EVERY 8 HOURS
Refills: 0 | Status: DISCONTINUED | OUTPATIENT
Start: 2024-09-18 | End: 2024-09-19

## 2024-09-18 RX ORDER — ONDANSETRON 2 MG/ML
4 INJECTION, SOLUTION INTRAMUSCULAR; INTRAVENOUS ONCE
Refills: 0 | Status: DISCONTINUED | OUTPATIENT
Start: 2024-09-18 | End: 2024-09-18

## 2024-09-18 RX ORDER — PANTOPRAZOLE SODIUM 40 MG
40 TABLET, DELAYED RELEASE (ENTERIC COATED) ORAL
Refills: 0 | Status: DISCONTINUED | OUTPATIENT
Start: 2024-09-18 | End: 2024-09-19

## 2024-09-18 RX ORDER — OXYCODONE HYDROCHLORIDE 5 MG/1
5 TABLET ORAL
Refills: 0 | Status: DISCONTINUED | OUTPATIENT
Start: 2024-09-18 | End: 2024-09-19

## 2024-09-18 RX ORDER — SODIUM CHLORIDE 9 MG/ML
1000 INJECTION INTRAMUSCULAR; INTRAVENOUS; SUBCUTANEOUS
Refills: 0 | Status: DISCONTINUED | OUTPATIENT
Start: 2024-09-18 | End: 2024-09-19

## 2024-09-18 RX ORDER — KETOROLAC TROMETHAMINE 30 MG/ML
30 INJECTION, SOLUTION INTRAMUSCULAR EVERY 8 HOURS
Refills: 0 | Status: COMPLETED | OUTPATIENT
Start: 2024-09-18 | End: 2024-09-19

## 2024-09-18 RX ORDER — OXYCODONE HYDROCHLORIDE 5 MG/1
10 TABLET ORAL
Refills: 0 | Status: DISCONTINUED | OUTPATIENT
Start: 2024-09-18 | End: 2024-09-19

## 2024-09-18 RX ORDER — NALOXONE HCL 1 MG/ML
4 VIAL (ML) INJECTION
Qty: 1 | Refills: 0
Start: 2024-09-18 | End: 2024-09-18

## 2024-09-18 RX ADMIN — SODIUM CHLORIDE 120 MILLILITER(S): 9 INJECTION INTRAMUSCULAR; INTRAVENOUS; SUBCUTANEOUS at 12:14

## 2024-09-18 RX ADMIN — CEFAZOLIN SODIUM 100 MILLIGRAM(S): 2 INJECTION, SOLUTION INTRAVENOUS at 16:32

## 2024-09-18 RX ADMIN — Medication 3 MILLIGRAM(S): at 21:52

## 2024-09-18 RX ADMIN — ISOSORBIDE MONONITRATE 30 MILLIGRAM(S): 30 TABLET, EXTENDED RELEASE ORAL at 21:52

## 2024-09-18 RX ADMIN — SODIUM CHLORIDE 3 MILLILITER(S): 9 INJECTION INTRAMUSCULAR; INTRAVENOUS; SUBCUTANEOUS at 21:43

## 2024-09-18 RX ADMIN — SODIUM CHLORIDE 3 MILLILITER(S): 9 INJECTION INTRAMUSCULAR; INTRAVENOUS; SUBCUTANEOUS at 13:49

## 2024-09-18 RX ADMIN — ACETAMINOPHEN 400 MILLIGRAM(S): 325 TABLET ORAL at 13:44

## 2024-09-18 RX ADMIN — Medication 100 MILLILITER(S): at 11:16

## 2024-09-18 RX ADMIN — ACETAMINOPHEN 1000 MILLIGRAM(S): 325 TABLET ORAL at 14:45

## 2024-09-18 RX ADMIN — ACETAMINOPHEN 1000 MILLIGRAM(S): 325 TABLET ORAL at 21:52

## 2024-09-18 RX ADMIN — ACETAMINOPHEN 1000 MILLIGRAM(S): 325 TABLET ORAL at 22:50

## 2024-09-18 RX ADMIN — KETOROLAC TROMETHAMINE 30 MILLIGRAM(S): 30 INJECTION, SOLUTION INTRAMUSCULAR at 20:14

## 2024-09-18 RX ADMIN — OXYCODONE HYDROCHLORIDE 15 MILLIGRAM(S): 5 TABLET ORAL at 17:30

## 2024-09-18 RX ADMIN — SODIUM CHLORIDE 500 MILLILITER(S): 9 INJECTION INTRAMUSCULAR; INTRAVENOUS; SUBCUTANEOUS at 11:22

## 2024-09-18 RX ADMIN — OXYCODONE HYDROCHLORIDE 15 MILLIGRAM(S): 5 TABLET ORAL at 16:31

## 2024-09-18 RX ADMIN — CELECOXIB 200 MILLIGRAM(S): 400 CAPSULE ORAL at 07:29

## 2024-09-18 RX ADMIN — Medication 1 TABLET(S): at 12:13

## 2024-09-18 RX ADMIN — LEVETIRACETAM 1000 MILLIGRAM(S): 1000 TABLET ORAL at 19:16

## 2024-09-18 RX ADMIN — KETOROLAC TROMETHAMINE 30 MILLIGRAM(S): 30 INJECTION, SOLUTION INTRAMUSCULAR at 21:00

## 2024-09-18 RX ADMIN — OXYCODONE HYDROCHLORIDE 15 MILLIGRAM(S): 5 TABLET ORAL at 13:13

## 2024-09-18 RX ADMIN — OXYCODONE HYDROCHLORIDE 15 MILLIGRAM(S): 5 TABLET ORAL at 12:13

## 2024-09-18 RX ADMIN — ACETAMINOPHEN 650 MILLIGRAM(S): 325 TABLET ORAL at 07:29

## 2024-09-18 RX ADMIN — POLYETHYLENE GLYCOL 3350 17 GRAM(S): 17 POWDER, FOR SOLUTION ORAL at 21:51

## 2024-09-18 RX ADMIN — Medication 2 TABLET(S): at 21:52

## 2024-09-18 NOTE — OCCUPATIONAL THERAPY INITIAL EVALUATION ADULT - BALANCE TRAINING, PT EVAL
Pt will show improvement in sit<>stand and static/dynamic standing balance to good within 3 weeks in order to improve performance of ADLs.

## 2024-09-18 NOTE — PHYSICAL THERAPY INITIAL EVALUATION ADULT - GAIT TRAINING, PT EVAL
Pt will improve ambulation to ~ 500 feet Independently using rolling walker within 2 weeks. Pt will negotiate ~ 3 steps c R-sided HR using SAC independently within 2 weeks.

## 2024-09-18 NOTE — PHYSICAL THERAPY INITIAL EVALUATION ADULT - GENERAL OBSERVATIONS, REHAB EVAL
Chart (EMR) reviewed. Received laying semisupine c HOB elevated, NAD. OT Yanique present. +heplock, +dressing c ace wrap to right knee intact, +B/L SCD's donned. Alert. Ox4. Able to follow multistep commands/directions.

## 2024-09-18 NOTE — OCCUPATIONAL THERAPY INITIAL EVALUATION ADULT - STRENGTHENING, PT EVAL
Pt will show improvement in R LE strength by 1-2 grades in order to improve balance and functional transfers.

## 2024-09-18 NOTE — OCCUPATIONAL THERAPY INITIAL EVALUATION ADULT - PRECAUTIONS/LIMITATIONS, REHAB EVAL
Pt's mobility and ADL management is limited due to pain in right knee ./fall precautions fall precautions

## 2024-09-18 NOTE — PHYSICAL THERAPY INITIAL EVALUATION ADULT - ADDITIONAL COMMENTS
Confirmed from pre-op note. Pt lives c his wife in a PH, 3 ORIANA from the Health system R-sided  to the basement where pt will be recovering. Pt has all DME.

## 2024-09-18 NOTE — CONSULT NOTE ADULT - SUBJECTIVE AND OBJECTIVE BOX
DESHAUN MAYO is a 68y Male s/p ROBOTIC ASSISTED RIGHT TOTAL KNEE ARTHROPLASTY WITH RUI      w/ h/o Seizures    HTN (hypertension)    HLD (hyperlipidemia)    Urinary retention    Arteriosclerotic heart disease (ASHD)      denies any chest pain shortness of breath palpitation dizziness lightheadedness nausea vomiting fever or chills    H/O cardiac radiofrequency ablation    S/P primary angioplasty    S/P total knee arthroplasty, left      FH: heart attack (Father, Mother)    Family history of cervical cancer (Mother)      SH: doesnot smoke or drink at this time    No Known Allergies    acetaminophen     Tablet .. 1000 milliGRAM(s) Oral every 8 hours  acetaminophen   IVPB .. 1000 milliGRAM(s) IV Intermittent once  amLODIPine   Tablet 5 milliGRAM(s) Oral daily  amoxicillin  500 milliGRAM(s)/clavulanate 1 Tablet(s) Oral two times a day  ceFAZolin   IVPB 2000 milliGRAM(s) IV Intermittent every 8 hours  ezetimibe 10 milliGRAM(s) Oral daily  influenza  Vaccine (HIGH DOSE) 0.5 milliLiter(s) IntraMuscular once  isosorbide   mononitrate ER Tablet (IMDUR) 30 milliGRAM(s) Oral at bedtime  isosorbide   mononitrate ER Tablet (IMDUR) 60 milliGRAM(s) Oral daily  ketorolac   Injectable 30 milliGRAM(s) IV Push every 8 hours  levETIRAcetam 1000 milliGRAM(s) Oral two times a day  magnesium hydroxide Suspension 30 milliLiter(s) Oral daily PRN  melatonin 3 milliGRAM(s) Oral at bedtime PRN  metoprolol succinate ER 50 milliGRAM(s) Oral daily  multivitamin 1 Tablet(s) Oral daily  ondansetron Injectable 4 milliGRAM(s) IV Push every 6 hours PRN  oxyCODONE    IR 10 milliGRAM(s) Oral every 3 hours PRN  oxyCODONE    IR 15 milliGRAM(s) Oral every 4 hours PRN  oxyCODONE    IR 5 milliGRAM(s) Oral every 3 hours PRN  pantoprazole    Tablet 40 milliGRAM(s) Oral before breakfast  polyethylene glycol 3350 17 Gram(s) Oral at bedtime  senna 2 Tablet(s) Oral at bedtime  sodium chloride 0.9% lock flush 3 milliLiter(s) IV Push every 8 hours  sodium chloride 0.9%. 1000 milliLiter(s) IV Continuous <Continuous>    T(C): 36.6 (09-18-24 @ 19:05), Max: 36.8 (09-18-24 @ 10:57)  HR: 75 (09-18-24 @ 19:05) (68 - 84)  BP: 104/62 (09-18-24 @ 19:05) (90/64 - 133/93)  RR: 16 (09-18-24 @ 19:05) (12 - 20)  SpO2: 95% (09-18-24 @ 19:05) (92% - 100%)  HEENT unremarkable  neck no JVD or bruit  heart normal S1 S2 RRR no gallops or rubs  chest clear to auscultation  abd sof nontender non distended +bs  ext no calf tenderness    A/P   DVT PX  pain control  bowel regimen   wound care as per ortho  GI PX  antiemetics prn  incentive spirometer

## 2024-09-18 NOTE — PHYSICAL THERAPY INITIAL EVALUATION ADULT - IMPAIRMENTS CONTRIBUTING TO GAIT DEVIATIONS, PT EVAL
impaired coordination/decreased flexibility/impaired motor control/pain/decreased ROM/decreased strength

## 2024-09-18 NOTE — DISCHARGE NOTE NURSING/CASE MANAGEMENT/SOCIAL WORK - PATIENT PORTAL LINK FT
You can access the FollowMyHealth Patient Portal offered by [facility] [place] by registering at the following website: [email or web address]. By joining [facility]’s FollowMyHealth portal, you will also be able to view your health information using other applications (apps) compatible with our system.

## 2024-09-18 NOTE — DISCHARGE NOTE PROVIDER - NSDCFUADDINST_GEN_ALL_CORE_FT
Keep knee straight while at rest. Elevate the leg as much as possible ("toes above the nose") to help control swelling. Make sure you get up and take a brief walk every two hours to help with circulation and prevent stiffness. Incentive spirometer 10X/hour. Cryocuff to help with pain/inflammation.  Keep Prineo Dressing Clean, Dry and Intact. May shower with Prineo Dressing. Please do not scrub, soak, peel or pick at the prineo dressing. No creams, lotions, or oils over dressing. May shower and let water run over incision, no baths. Pat dry once out of shower. Dressing to be removed in office at follow up visit in 2 weeks. There are no staples or stitches that need to be removed.  If you notice any redness, irritation, or itching around the prineo dressing call the surgeon's office  Per Dr. Krishna: may advance from walker as tolerated per discretion of physical therapist.

## 2024-09-18 NOTE — DISCHARGE NOTE PROVIDER - CARE PROVIDER_API CALL
Mike Krishna.  Orthopaedic Surgery  [address] [facility], [address] [place] [id number]  Phone: [phone]  Fax: [phone]  Follow Up Time:

## 2024-09-18 NOTE — DISCHARGE NOTE PROVIDER - NSDCFUADDAPPT_GEN_ALL_CORE_FT
Follow up with your surgeon in two weeks. Call for appointment.  If you need more pain medication, call your surgeon's office. For medication refills or authorizations, please call [phone]  We recommend that you call and schedule a follow up appointment within 2-4 weeks with your primary care physician for repeat blood work (CBC and BMP) for post hospital discharge follow-up care.  Call your surgeon if you have increased redness/pain/drainage or fever. Return to ER for shortness of breath/calf tenderness.

## 2024-09-18 NOTE — PHYSICAL THERAPY INITIAL EVALUATION ADULT - RANGE OF MOTION EXAMINATION, REHAB EVAL
right knee flexion grossly ~5-90 degrees & limited due to pain/bilateral upper extremity ROM was WNL (within normal limits)/bilateral lower extremity ROM was WFL (within functional limits)/deficits as listed below

## 2024-09-18 NOTE — OCCUPATIONAL THERAPY INITIAL EVALUATION ADULT - ADDITIONAL COMMENTS
Pt lives with wife in a private house with 5 steps to enter with a L handrail. Once inside, the pt has 2 steps with a R handrail to reach the main floor where the bedroom and bathroom is. The pt bathroom has a tub/shower combination, fixed/retractable shower head, comfort height toilet seat and 4x grab bars inside the shower and next to the toilet,. Pt's toilet has adequate space to fit a commode over it. At this time, pt was functioning in his roles, self sufficient, driving & ambulating independently at home without any adaptive device, but uses a SAC in the community. Pt owns a rolling walker and 3:1 commode from prior left TKR. Pt is right hand dominant and wears glasses for reading.

## 2024-09-18 NOTE — DISCHARGE NOTE PROVIDER - HOSPITAL COURSE
68yMale with history of Right knee OA presenting for R TKA by Dr. Krishna on 9/18/24. Risk and benefits of surgery were explained to the patient. The patient understood and agreed to proceed with surgery. Patient underwent the procedure with no intraoperative complications. Pt was brought in stable condition to the PACU. Once stable in PACU, pt was brought to the floor. During hospital stay pt was followed by Medicine, physical therapy, Home Care during this admission. Pt is stable for discharge 68yMale with history of Right knee OA presenting for R TKA by Dr. Krishna on 9/18/24. Risk and benefits of surgery were explained to the patient. The patient understood and agreed to proceed with surgery. Patient underwent the procedure with no intraoperative complications. Pt was brought in stable condition to the PACU. Once stable in PACU, pt was brought to the floor. During hospital stay pt was followed by Medicine, physical therapy, Home Care during this admission. Pt is stable for discharge home on POD#1

## 2024-09-18 NOTE — DISCHARGE NOTE PROVIDER - NSDCFUSCHEDAPPT_GEN_ALL_CORE_FT
Mike Krishna  [facility]  [address] [facility]  Scheduled Appointment: 09/30/2024

## 2024-09-18 NOTE — DISCHARGE NOTE PROVIDER - NSDCMRMEDTOKEN_GEN_ALL_CORE_FT
amLODIPine 5 mg oral tablet: 1 tab(s) orally once a day  amoxicillin-clavulanate 500 mg-125 mg oral tablet: 1 tab(s) orally 2 times a day  aspirin 81 mg oral tablet: orally once a day  atorvastatin 40 mg oral tablet: 1 tab(s) orally once a day  Colace 100 mg oral capsule: 1 cap(s) orally 2 times a day as needed for  constipation  Eliquis 5 mg oral tablet: 1 tab(s) orally 2 times a day  isosorbide mononitrate 30 mg oral tablet, extended release: 1 tab(s) orally once a day (at bedtime)  isosorbide mononitrate 60 mg oral tablet, extended release: 1 tab(s) orally once a day  levETIRAcetam 1000 mg oral tablet: 1 tab(s) orally 2 times a day  metoprolol succinate 50 mg oral capsule, extended release: 1 cap(s) orally once a day  Multiple Vitamins oral tablet: 1 tab(s) orally once a day  Narcan 4 mg/0.1 mL nasal spray: 4 milligram(s) intranasally once , repeat as necessary.   Required with Opioid Rx  As needed. For suspected opiate overdose   Follow instructions on packet MDD: 0.2 ml  Zetia 10 mg oral tablet: 1 tab(s) orally once a day   acetaminophen 500 mg oral tablet: 2 tab(s) orally every 8 hours  amLODIPine 5 mg oral tablet: 1 tab(s) orally once a day  amoxicillin-clavulanate 500 mg-125 mg oral tablet: 1 tab(s) orally 2 times a day  aspirin: 81 milligram(s) orally once a day Resume home dosing  atorvastatin 40 mg oral tablet: 1 tab(s) orally once a day  celecoxib 200 mg oral capsule: 1 cap(s) orally every 12 hours x 30 days MDD: 2  Colace 100 mg oral capsule: 1 cap(s) orally 2 times a day as needed for  constipation  Eliquis 5 mg oral tablet: 1 tab(s) orally 2 times a day  isosorbide mononitrate 30 mg oral tablet, extended release: 1 tab(s) orally once a day (at bedtime)  isosorbide mononitrate 60 mg oral tablet, extended release: 1 tab(s) orally once a day  levETIRAcetam 1000 mg oral tablet: 1 tab(s) orally 2 times a day  metoprolol succinate 50 mg oral capsule, extended release: 1 cap(s) orally once a day  Multiple Vitamins oral tablet: 1 tab(s) orally once a day  Narcan 4 mg/0.1 mL nasal spray: 4 milligram(s) intranasally once , repeat as necessary.   Required with Opioid Rx  As needed. For suspected opiate overdose   Follow instructions on packet MDD: 0.2 ml  oxyCODONE 5 mg oral tablet: 1 tab(s) orally every 4 hours as needed for  moderate pain 2 tabs for severe pain MDD: 10  pantoprazole 40 mg oral delayed release tablet: 1 tab(s) orally once a day MDD: 1  Zetia 10 mg oral tablet: 1 tab(s) orally once a day

## 2024-09-18 NOTE — OCCUPATIONAL THERAPY INITIAL EVALUATION ADULT - GENERAL OBSERVATIONS, REHAB EVAL
Chart reviewed. Pt encountered semi-supine in bed, NAD, +b/l SCD's, +ace wrap to R LE clean and intact. A&Ox4. Pt agreeable to OT amy.

## 2024-09-18 NOTE — PHYSICAL THERAPY INITIAL EVALUATION ADULT - IMPAIRMENTS FOUND, PT EVAL
ergonomics and body mechanics/gait, locomotion, and balance/gross motor/joint integrity and mobility/muscle strength/neuromotor development and sensory integration/ROM

## 2024-09-18 NOTE — DISCHARGE NOTE NURSING/CASE MANAGEMENT/SOCIAL WORK - NSDCFUADDAPPT_GEN_ALL_CORE_FT
Follow up with your surgeon in two weeks. Call for appointment.  If you need more pain medication, call your surgeon's office. For medication refills or authorizations, please call [phone]  We recommend that you call and schedule a follow up appointment within 2-4 weeks with your primary care physician for repeat blood work (CBC and BMP) for post hospital discharge follow-up care.  Call your surgeon if you have increased redness/pain/drainage or fever. Return to ER for shortness of breath/calf tenderness.

## 2024-09-19 VITALS
SYSTOLIC BLOOD PRESSURE: 127 MMHG | RESPIRATION RATE: 19 BRPM | OXYGEN SATURATION: 99 % | HEART RATE: 68 BPM | DIASTOLIC BLOOD PRESSURE: 76 MMHG

## 2024-09-19 LAB
ANION GAP SERPL CALC-SCNC: 10 MMOL/L — SIGNIFICANT CHANGE UP (ref 5–17)
BUN SERPL-MCNC: 12 MG/DL — SIGNIFICANT CHANGE UP (ref 7–23)
CALCIUM SERPL-MCNC: 9.2 MG/DL — SIGNIFICANT CHANGE UP (ref 8.5–10.1)
CHLORIDE SERPL-SCNC: 104 MMOL/L — SIGNIFICANT CHANGE UP (ref 96–108)
CO2 SERPL-SCNC: 25 MMOL/L — SIGNIFICANT CHANGE UP (ref 22–31)
CREAT SERPL-MCNC: 1.07 MG/DL — SIGNIFICANT CHANGE UP (ref 0.5–1.3)
EGFR: 76 ML/MIN/1.73M2 — SIGNIFICANT CHANGE UP
GLUCOSE SERPL-MCNC: 109 MG/DL — HIGH (ref 70–99)
HCT VFR BLD CALC: 41.2 % — SIGNIFICANT CHANGE UP (ref 39–50)
HGB BLD-MCNC: 14.2 G/DL — SIGNIFICANT CHANGE UP (ref 13–17)
MCHC RBC-ENTMCNC: 31.4 PG — SIGNIFICANT CHANGE UP (ref 27–34)
MCHC RBC-ENTMCNC: 34.5 G/DL — SIGNIFICANT CHANGE UP (ref 32–36)
MCV RBC AUTO: 91.2 FL — SIGNIFICANT CHANGE UP (ref 80–100)
NRBC # BLD: 0 /100 WBCS — SIGNIFICANT CHANGE UP (ref 0–0)
PLATELET # BLD AUTO: 343 K/UL — SIGNIFICANT CHANGE UP (ref 150–400)
POTASSIUM SERPL-MCNC: 3.8 MMOL/L — SIGNIFICANT CHANGE UP (ref 3.5–5.3)
POTASSIUM SERPL-SCNC: 3.8 MMOL/L — SIGNIFICANT CHANGE UP (ref 3.5–5.3)
RBC # BLD: 4.52 M/UL — SIGNIFICANT CHANGE UP (ref 4.2–5.8)
RBC # FLD: 12 % — SIGNIFICANT CHANGE UP (ref 10.3–14.5)
SODIUM SERPL-SCNC: 139 MMOL/L — SIGNIFICANT CHANGE UP (ref 135–145)
WBC # BLD: 17.66 K/UL — HIGH (ref 3.8–10.5)
WBC # FLD AUTO: 17.66 K/UL — HIGH (ref 3.8–10.5)

## 2024-09-19 RX ORDER — SODIUM CHLORIDE 9 MG/ML
1000 INJECTION INTRAMUSCULAR; INTRAVENOUS; SUBCUTANEOUS ONCE
Refills: 0 | Status: COMPLETED | OUTPATIENT
Start: 2024-09-19 | End: 2024-09-19

## 2024-09-19 RX ORDER — ASPIRIN 81 MG
81 TABLET, DELAYED RELEASE (ENTERIC COATED) ORAL
Qty: 0 | Refills: 0 | DISCHARGE
Start: 2024-09-19

## 2024-09-19 RX ORDER — ACETAMINOPHEN 325 MG/1
2 TABLET ORAL
Qty: 0 | Refills: 0 | DISCHARGE
Start: 2024-09-19

## 2024-09-19 RX ADMIN — SODIUM CHLORIDE 3 MILLILITER(S): 9 INJECTION INTRAMUSCULAR; INTRAVENOUS; SUBCUTANEOUS at 05:21

## 2024-09-19 RX ADMIN — ACETAMINOPHEN 1000 MILLIGRAM(S): 325 TABLET ORAL at 06:43

## 2024-09-19 RX ADMIN — Medication 102 MILLIGRAM(S): at 05:45

## 2024-09-19 RX ADMIN — KETOROLAC TROMETHAMINE 30 MILLIGRAM(S): 30 INJECTION, SOLUTION INTRAMUSCULAR at 04:53

## 2024-09-19 RX ADMIN — APIXABAN 2.5 MILLIGRAM(S): 5 TABLET, FILM COATED ORAL at 05:45

## 2024-09-19 RX ADMIN — Medication 40 MILLIGRAM(S): at 05:46

## 2024-09-19 RX ADMIN — LEVETIRACETAM 1000 MILLIGRAM(S): 1000 TABLET ORAL at 05:45

## 2024-09-19 RX ADMIN — AMOXICILLIN AND CLAVULANATE POTASSIUM 1 TABLET(S): 250; 125 TABLET, FILM COATED ORAL at 06:09

## 2024-09-19 RX ADMIN — KETOROLAC TROMETHAMINE 30 MILLIGRAM(S): 30 INJECTION, SOLUTION INTRAMUSCULAR at 05:30

## 2024-09-19 RX ADMIN — CELECOXIB 200 MILLIGRAM(S): 400 CAPSULE ORAL at 05:46

## 2024-09-19 RX ADMIN — CEFAZOLIN SODIUM 100 MILLIGRAM(S): 2 INJECTION, SOLUTION INTRAVENOUS at 00:45

## 2024-09-19 RX ADMIN — SODIUM CHLORIDE 120 MILLILITER(S): 9 INJECTION INTRAMUSCULAR; INTRAVENOUS; SUBCUTANEOUS at 04:53

## 2024-09-19 RX ADMIN — ISOSORBIDE MONONITRATE 60 MILLIGRAM(S): 30 TABLET, EXTENDED RELEASE ORAL at 10:33

## 2024-09-19 RX ADMIN — EZETIMIBE 10 MILLIGRAM(S): 10 TABLET ORAL at 06:09

## 2024-09-19 RX ADMIN — CELECOXIB 200 MILLIGRAM(S): 400 CAPSULE ORAL at 06:43

## 2024-09-19 RX ADMIN — SODIUM CHLORIDE 120 MILLILITER(S): 9 INJECTION INTRAMUSCULAR; INTRAVENOUS; SUBCUTANEOUS at 00:45

## 2024-09-19 RX ADMIN — ACETAMINOPHEN 1000 MILLIGRAM(S): 325 TABLET ORAL at 05:45

## 2024-09-19 RX ADMIN — SODIUM CHLORIDE 500 MILLILITER(S): 9 INJECTION INTRAMUSCULAR; INTRAVENOUS; SUBCUTANEOUS at 07:05

## 2024-09-19 NOTE — PROGRESS NOTE ADULT - SUBJECTIVE AND OBJECTIVE BOX
Patient is 68y y/o Male s/p R TKA POD#0  Patient is seen and examined at bedside.   Pt tolerated procedure well without any intra-op complications.    Pain is controlled.  Denies CP/SOB/Dizziness/N/V/D/HA.     Vital Signs Last 24 Hrs  T(C): 36.5 (18 Sep 2024 13:00), Max: 36.8 (18 Sep 2024 10:57)  T(F): 97.7 (18 Sep 2024 13:00), Max: 98.3 (18 Sep 2024 11:27)  HR: 82 (18 Sep 2024 13:00) (68 - 84)  BP: 118/77 (18 Sep 2024 13:00) (90/64 - 133/93)  BP(mean): 73 (18 Sep 2024 11:12) (73 - 77)  RR: 16 (18 Sep 2024 13:00) (12 - 20)  SpO2: 92% (18 Sep 2024 13:00) (92% - 100%)    Parameters below as of 18 Sep 2024 13:00  Patient On (Oxygen Delivery Method): room air          PHYSICAL EXAM:  General: A&Ox3 NAD  RLE: Dressing C/D/I with ACE wrap in place. Motor intact + EHL/FHL/TA/GS.  Sensation is grossly intact.  Extremity warm, compartments soft, compressible. No calf tenderness. DP 2+   LLE: Motor intact +EHL/FHL/TA/GS. Sensation is grossly intact. Extremity warm, compartments soft, compressible. No calf tenderness. DP2+    Labs:                          13.8   11.47 )-----------( 322      ( 18 Sep 2024 11:15 )             39.9       09-18    138  |  105  |  10  ----------------------------<  118[H]  4.5   |  28  |  0.97    Ca    9.2      18 Sep 2024 11:15        A/P: Patient is a 68y y/o Male s/p R TKA, POD # 0  -wound care, knee extension/leg elevation, cryocuff, isometric exercises, new medications reviewed with pt  -Pain control/analgesia  -Inc spirometry reviewed with pt, demonstrated competence  -DVT prophylaxis with Venodynes/Aspirin 81 BID  -F/U AM Labs  -PT/OT/WBAT  -prophylactic Antibiotic  -medical consult  -DC planning    
DESHAUN MAYO is a 68y Male s/p ROBOTIC ASSISTED RIGHT TOTAL KNEE ARTHROPLASTY WITH RUI        denies any chest pain shortness of breath palpitation dizziness lightheadedness nausea vomiting fever or chills    T(C): 36.4 (09-19-24 @ 05:00), Max: 36.8 (09-18-24 @ 10:57)  HR: 86 (09-19-24 @ 05:00) (68 - 86)  BP: 102/65 (09-19-24 @ 05:00) (90/64 - 123/75)  RR: 18 (09-19-24 @ 05:00) (12 - 20)  SpO2: 100% (09-19-24 @ 05:00) (92% - 100%)  no jvd/bruit  s1 s2 rrr  cta  s/nt/nd  no calf tend                        14.2   17.66 )-----------( 343      ( 19 Sep 2024 06:08 )             41.2   09-19    139  |  104  |  12  ----------------------------<  109[H]  3.8   |  25  |  1.07    Ca    9.2      19 Sep 2024 06:08        cont dvt px  pain control  bowel regimen  antiemetics  incentive spirometer
Patient is seen and examined at bedside. Denies CP/SOB/Dizziness/N/V/D/HA. Pain is controlled.     Vital Signs Last 24 Hrs  T(C): 36.4 (19 Sep 2024 09:08), Max: 36.6 (18 Sep 2024 15:00)  T(F): 97.5 (19 Sep 2024 09:08), Max: 97.9 (18 Sep 2024 19:05)  HR: 68 (19 Sep 2024 10:15) (68 - 86)  BP: 127/76 (19 Sep 2024 10:15) (102/65 - 127/76)  BP(mean): --  RR: 19 (19 Sep 2024 10:15) (16 - 19)  SpO2: 99% (19 Sep 2024 10:15) (92% - 100%)    Parameters below as of 19 Sep 2024 10:15  Patient On (Oxygen Delivery Method): room air          PHYSICAL EXAM:  General: NAD  Neuro:  Alert & responsive  HEENT: NCAT, EOMI, conjunctiva clear  abd: soft, NT/ND  Right LE: Prineo dressing C/D/I. Motor intact + EHL/FHL/TA/GS.  Sensation is grossly intact.  Extremity warm, compartments soft, compressible. No calf tenderness. DP 2+   Left LE: Motor intact +EHL/FHL/TA/GS. Sensation is grossly intact. Extremity warm, compartments soft, compressible. No calf tenderness. DP2+    Labs:                          14.2   17.66 )-----------( 343      ( 19 Sep 2024 06:08 )             41.2       09-19    139  |  104  |  12  ----------------------------<  109[H]  3.8   |  25  |  1.07    Ca    9.2      19 Sep 2024 06:08        A/P: Patient is a 68y y/o Male s/p right TKA, POD # 1  -wound care, knee extension/leg elevation, cryocuff, isometric exercises, new medications reviewed with pt  -Pain control/analgesia reviewed   -Inc spirometry reviewed with pt, demonstrated competence  -DVT prophylaxis with Venodynes/Aspirin  -Pt requires a rolling walker for MRADLs at home  -PT/OT/WBAT  -medical consult reviewed   -DC planning: for home today with home care  -Pt seen in am with DR Krishna

## 2024-09-20 ENCOUNTER — TRANSCRIPTION ENCOUNTER (OUTPATIENT)
Age: 69
End: 2024-09-20

## 2024-09-21 ENCOUNTER — NON-APPOINTMENT (OUTPATIENT)
Age: 69
End: 2024-09-21

## 2024-09-23 ENCOUNTER — TRANSCRIPTION ENCOUNTER (OUTPATIENT)
Age: 69
End: 2024-09-23

## 2024-09-23 LAB — SURGICAL PATHOLOGY STUDY: SIGNIFICANT CHANGE UP

## 2024-09-24 DIAGNOSIS — Z79.82 LONG TERM (CURRENT) USE OF ASPIRIN: ICD-10-CM

## 2024-09-24 DIAGNOSIS — E78.5 HYPERLIPIDEMIA, UNSPECIFIED: ICD-10-CM

## 2024-09-24 DIAGNOSIS — M17.11 UNILATERAL PRIMARY OSTEOARTHRITIS, RIGHT KNEE: ICD-10-CM

## 2024-09-24 DIAGNOSIS — Z96.652 PRESENCE OF LEFT ARTIFICIAL KNEE JOINT: ICD-10-CM

## 2024-09-24 DIAGNOSIS — I48.0 PAROXYSMAL ATRIAL FIBRILLATION: ICD-10-CM

## 2024-09-24 DIAGNOSIS — Z98.61 CORONARY ANGIOPLASTY STATUS: ICD-10-CM

## 2024-09-24 DIAGNOSIS — Z79.01 LONG TERM (CURRENT) USE OF ANTICOAGULANTS: ICD-10-CM

## 2024-09-24 DIAGNOSIS — I25.10 ATHEROSCLEROTIC HEART DISEASE OF NATIVE CORONARY ARTERY WITHOUT ANGINA PECTORIS: ICD-10-CM

## 2024-09-24 DIAGNOSIS — I10 ESSENTIAL (PRIMARY) HYPERTENSION: ICD-10-CM

## 2024-09-28 ENCOUNTER — NON-APPOINTMENT (OUTPATIENT)
Age: 69
End: 2024-09-28

## 2024-09-30 ENCOUNTER — APPOINTMENT (OUTPATIENT)
Dept: ORTHOPEDIC SURGERY | Facility: CLINIC | Age: 69
End: 2024-09-30
Payer: MEDICARE

## 2024-09-30 VITALS — WEIGHT: 220 LBS | HEIGHT: 71 IN | BODY MASS INDEX: 30.8 KG/M2

## 2024-09-30 DIAGNOSIS — Z96.652 PRESENCE OF LEFT ARTIFICIAL KNEE JOINT: ICD-10-CM

## 2024-09-30 DIAGNOSIS — Z96.651 PRESENCE OF RIGHT ARTIFICIAL KNEE JOINT: ICD-10-CM

## 2024-09-30 PROBLEM — I25.10 ATHEROSCLEROTIC HEART DISEASE OF NATIVE CORONARY ARTERY WITHOUT ANGINA PECTORIS: Chronic | Status: ACTIVE | Noted: 2024-09-09

## 2024-09-30 PROCEDURE — 73562 X-RAY EXAM OF KNEE 3: CPT | Mod: RT

## 2024-09-30 PROCEDURE — 99024 POSTOP FOLLOW-UP VISIT: CPT

## 2024-09-30 NOTE — ASSESSMENT
[FreeTextEntry1] : Previous doc: 1/8/2024: Discussed treatment options for B/L Adv Knee OA.  Patient does have some complex history with a neurogenic bladder and self-catheterization history of failed stent with collaterals atrial fibrillation and on Eliquis.  Also had a history of 2 small seizures recently and is on Keppra for this.  PT has exhausted conservative treatment options in the form of PT and injection therapy. Discussed Knee TKA. PT inquired about doing both knees but discussed risks due to his medical hx  and would recommend doing 1 TKA  at a time. PT would like to proceed with L  knee TKA and   R knee TKA. Plan for B/L  Knee CT scan in compliance with RUI protocol. PT is not a candidate for NSAIDs due to being on Eliquis. Pt will obtain neurological and cardiac clearance before surgery 1/22/24: Planning for TKA in April, will try zilretta both knees today and proceed with surgery end of April.  CT reviewed. 5/6/24: PO #1. 2 weeks s/p L TKA. XR look good and has great motion and function. Cannot take NSAIDs due to blood thinners. Renewed oxy Rx but discussed I would like him to ween off narcotics. Begin outpatient PT. Does have an effusion and sig pain in the right knee- hold off on aspirating till next visit. Would like to start planning for R TKA- he will obtain CT scan RUI protocol. Follow up 4 weeks.  6/3/24: Left TKA doing well, cont PT.  Right knee cont pain, planning for TKA in September.  Will give one more zilretta inj today, tolerated well. 7/1/24: 2.5 months s/p L TKA. Aspiration today, tolerated well. He is scheduled for R TKA in 2 months.   9/30/24: 12 days s/p R TKA. XR look good. Renewed oxy. Has only done 3 at home PT sessions since surgery- I am recc he begin outpatient PT 3x a week with HEP daily. Reassured pt that the warmth in his LT knee is normal for the course. Follow up 4 weeks. discussed more focus on ROM - excer given

## 2024-09-30 NOTE — HISTORY OF PRESENT ILLNESS
[de-identified] : 9/30/24: 12 days s/p R TKA. Currently doing at home PT- feels it is beneficial and making improvement. Taking 3 oxy a day with good relief, occ still wakes up at night. 5 months s/p L TKA- doing well, still feels some warmth but no pain. Ambulates with cane.   Previous doc: 1/8/24: Prev seen by Martín and reports B/L knee pain. Had prev aspiration last month. Pt expresses 2 weeks of relief from previous injection therapy. Pt has stent and hx of  ablations and seizures. Pt is on Kepra and Eliquis. Pt has neurological and bladder medical complications and Cerasis.    1/22/24: Worsening pain, had CT both knees done and scheduled for TKA April 24. 5/6/24: PO #1. 2 weeks s/p L TKA. Currently doing PT- feels it is beneficial, making progression. Taking 4-5 oxy a day with good relief, states advil works well but unable to take due to blood thinners. Most pain posteriorly. Using cane.  6/3/24: 6 weeks s/p left TKA, improving.  Still some warmth in knee.  Pain after PT.  No fevers/chills.  Worsening right knee pain, planning for TKA end of September. 7/1/24: 2.5 months s/p L TKA. He continues to have swelling and some pain in the left knee. He is scheduled for R TKA in 2 months [] : no [FreeTextEntry5] : slight swelling.  [de-identified] : hep  [de-identified] : 09/18/24 [de-identified] : RT TKA RUI

## 2024-09-30 NOTE — DISCUSSION/SUMMARY
[de-identified] : We discussed the patient's progress and they were reminded of their antibiotic prophylaxis.  We discussed continued physical therapy and/or a home exercise program.  Questions about their knee replacement and future follow up were answered and discussed.The incision was inspected and was clean and dry with no drainage.  The patient was instructed to call for fevers, chills, wound drainage, wound opening, redness, or any other concerns.  Entered by Vita Banda acting as a scribe.

## 2024-09-30 NOTE — PHYSICAL EXAM
[de-identified] : Affected side: RIGHT KNEE Incision is clean and dry with no drainage - dressing removed - Sensation intact +1 edema LE Decreased ROM: 5-80 with guarding moderate effusion  Xray 3 views knee - Implants good position and well fixed - no fracture or dislocation

## 2024-10-09 ENCOUNTER — TRANSCRIPTION ENCOUNTER (OUTPATIENT)
Age: 69
End: 2024-10-09

## 2024-10-16 ENCOUNTER — TRANSCRIPTION ENCOUNTER (OUTPATIENT)
Age: 69
End: 2024-10-16

## 2024-11-04 ENCOUNTER — APPOINTMENT (OUTPATIENT)
Dept: ORTHOPEDIC SURGERY | Facility: CLINIC | Age: 69
End: 2024-11-04
Payer: MEDICARE

## 2024-11-04 VITALS — BODY MASS INDEX: 30.8 KG/M2 | WEIGHT: 220 LBS | HEIGHT: 71 IN

## 2024-11-04 DIAGNOSIS — M17.12 UNILATERAL PRIMARY OSTEOARTHRITIS, LEFT KNEE: ICD-10-CM

## 2024-11-04 DIAGNOSIS — Z96.651 PRESENCE OF RIGHT ARTIFICIAL KNEE JOINT: ICD-10-CM

## 2024-11-04 DIAGNOSIS — Z96.652 PRESENCE OF LEFT ARTIFICIAL KNEE JOINT: ICD-10-CM

## 2024-11-04 DIAGNOSIS — M17.11 UNILATERAL PRIMARY OSTEOARTHRITIS, RIGHT KNEE: ICD-10-CM

## 2024-11-04 PROCEDURE — 99024 POSTOP FOLLOW-UP VISIT: CPT

## 2024-11-04 PROCEDURE — 73562 X-RAY EXAM OF KNEE 3: CPT | Mod: RT

## 2024-12-16 ENCOUNTER — APPOINTMENT (OUTPATIENT)
Dept: ORTHOPEDIC SURGERY | Facility: CLINIC | Age: 69
End: 2024-12-16
Payer: MEDICARE

## 2024-12-16 VITALS — HEIGHT: 71 IN | BODY MASS INDEX: 30.8 KG/M2 | WEIGHT: 220 LBS

## 2024-12-16 DIAGNOSIS — M17.12 UNILATERAL PRIMARY OSTEOARTHRITIS, LEFT KNEE: ICD-10-CM

## 2024-12-16 DIAGNOSIS — M17.11 UNILATERAL PRIMARY OSTEOARTHRITIS, RIGHT KNEE: ICD-10-CM

## 2024-12-16 DIAGNOSIS — Z96.652 PRESENCE OF LEFT ARTIFICIAL KNEE JOINT: ICD-10-CM

## 2024-12-16 DIAGNOSIS — Z96.651 PRESENCE OF RIGHT ARTIFICIAL KNEE JOINT: ICD-10-CM

## 2024-12-16 PROCEDURE — 99024 POSTOP FOLLOW-UP VISIT: CPT

## 2024-12-16 PROCEDURE — 20610 DRAIN/INJ JOINT/BURSA W/O US: CPT | Mod: 50

## 2024-12-20 LAB
ALPHA DEFENSIN, SYNOVIAL FL: NEGATIVE
B PERT IGG+IGM PNL SER: ABNORMAL
B PERT IGG+IGM PNL SER: ABNORMAL
COLOR FLD: NORMAL
COLOR FLD: NORMAL
EOSINOPHIL # FLD MANUAL: 2 %
FLUID INTAKE SUBSTANCE CLASS: NORMAL
FLUID INTAKE SUBSTANCE CLASS: NORMAL
JOINT PCR PANEL: NOT DETECTED
JOINT PCR PANEL: NOT DETECTED
JOINT PCR SOURCE: NORMAL
JOINT PCR SOURCE: NORMAL
LYMPHOCYTES # FLD MANUAL: 20 %
LYMPHOCYTES # FLD MANUAL: 31 %
MONOS+MACROS NFR FLD MANUAL: 40 %
MONOS+MACROS NFR FLD MANUAL: 76 %
NEUTS SEG # FLD MANUAL: 27 %
NEUTS SEG # FLD MANUAL: 4 %
PRELIM TEST AFFECT FURTHER TEST SPEC: YES
RBC # FLD MANUAL: 8000 CELLS/UL
RBC # FLD MANUAL: NORMAL CELLS/UL
SPECIMEN SOURCE SNV: NORMAL
SYCRY CLARITY: ABNORMAL
SYCRY CLARITY: ABNORMAL
SYCRY COLOR: ABNORMAL
SYCRY COLOR: ABNORMAL
SYCRY ID: NORMAL
SYCRY ID: NORMAL
SYCRY TUBE: NORMAL
SYCRY TUBE: NORMAL
TOTAL CELLS COUNTED FLD: 152 CELLS/UL
TOTAL CELLS COUNTED FLD: 1730 CELLS/UL
TUBE TYPE: NORMAL
TUBE TYPE: NORMAL
WBC COUNT: 147 CELLS/UL

## 2024-12-27 LAB
JOINT CULTURE: NORMAL
JOINT CULTURE: NORMAL

## 2024-12-30 ENCOUNTER — APPOINTMENT (OUTPATIENT)
Dept: ORTHOPEDIC SURGERY | Facility: CLINIC | Age: 69
End: 2024-12-30
Payer: MEDICARE

## 2024-12-30 VITALS — HEIGHT: 71 IN | BODY MASS INDEX: 30.8 KG/M2 | WEIGHT: 220 LBS

## 2024-12-30 DIAGNOSIS — M17.11 UNILATERAL PRIMARY OSTEOARTHRITIS, RIGHT KNEE: ICD-10-CM

## 2024-12-30 DIAGNOSIS — Z96.651 PRESENCE OF RIGHT ARTIFICIAL KNEE JOINT: ICD-10-CM

## 2024-12-30 DIAGNOSIS — Z96.652 PRESENCE OF LEFT ARTIFICIAL KNEE JOINT: ICD-10-CM

## 2024-12-30 DIAGNOSIS — M17.12 UNILATERAL PRIMARY OSTEOARTHRITIS, LEFT KNEE: ICD-10-CM

## 2024-12-30 PROCEDURE — 99214 OFFICE O/P EST MOD 30 MIN: CPT

## 2025-02-10 ENCOUNTER — APPOINTMENT (OUTPATIENT)
Dept: ORTHOPEDIC SURGERY | Facility: CLINIC | Age: 70
End: 2025-02-10
Payer: MEDICARE

## 2025-02-10 ENCOUNTER — LABORATORY RESULT (OUTPATIENT)
Age: 70
End: 2025-02-10

## 2025-02-10 VITALS — WEIGHT: 220 LBS | HEIGHT: 71 IN | BODY MASS INDEX: 30.8 KG/M2

## 2025-02-10 DIAGNOSIS — Z96.652 PRESENCE OF LEFT ARTIFICIAL KNEE JOINT: ICD-10-CM

## 2025-02-10 DIAGNOSIS — Z96.651 PRESENCE OF RIGHT ARTIFICIAL KNEE JOINT: ICD-10-CM

## 2025-02-10 PROCEDURE — 99214 OFFICE O/P EST MOD 30 MIN: CPT | Mod: 25

## 2025-02-10 PROCEDURE — 20610 DRAIN/INJ JOINT/BURSA W/O US: CPT | Mod: 50

## 2025-02-13 LAB
B PERT IGG+IGM PNL SER: ABNORMAL
B PERT IGG+IGM PNL SER: ABNORMAL
COLOR FLD: NORMAL
COLOR FLD: NORMAL
EOSINOPHIL # FLD MANUAL: 0 %
EOSINOPHIL # FLD MANUAL: 0 %
FLUID INTAKE SUBSTANCE CLASS: NORMAL
FLUID INTAKE SUBSTANCE CLASS: NORMAL
JOINT PCR PANEL: NOT DETECTED
JOINT PCR PANEL: NOT DETECTED
JOINT PCR SOURCE: NORMAL
JOINT PCR SOURCE: NORMAL
LYMPHOCYTES # FLD MANUAL: 13 %
LYMPHOCYTES # FLD MANUAL: 9 %
MESOTHL CELL NFR FLD: 0 %
MESOTHL CELL NFR FLD: NORMAL
MONOS+MACROS NFR FLD MANUAL: 80 %
MONOS+MACROS NFR FLD MANUAL: 87 %
NEUTS SEG # FLD MANUAL: 4 %
NEUTS SEG # FLD MANUAL: 7 %
NRBC # FLD: 0 %
NRBC # FLD: 0 %
RBC # FLD MANUAL: NORMAL CELLS/UL
RBC # FLD MANUAL: NORMAL CELLS/UL
SYCRY CLARITY: ABNORMAL
SYCRY CLARITY: ABNORMAL
SYCRY COLOR: ABNORMAL
SYCRY COLOR: ABNORMAL
SYCRY ID: ABNORMAL
SYCRY ID: ABNORMAL
SYCRY TUBE: NORMAL
SYCRY TUBE: NORMAL
TOTAL CELLS COUNTED FLD: 246 CELLS/UL
TOTAL CELLS COUNTED FLD: 82 CELLS/UL
TUBE TYPE: NORMAL
TUBE TYPE: NORMAL
UNIDENT CELLS NFR FLD MANUAL: 0 %
UNIDENT CELLS NFR FLD MANUAL: 0 %
VARIANT LYMPHS # FLD MANUAL: 0 %
VARIANT LYMPHS # FLD MANUAL: 0 %
WBC COUNT: 236 CELLS/UL
WBC COUNT: 78 CELLS/UL

## 2025-02-18 LAB
ALPHA DEFENSIN, SYNOVIAL FL: NEGATIVE
JOINT CULTURE: NORMAL
PRELIM TEST AFFECT FURTHER TEST SPEC: YES
SPECIMEN SOURCE SNV: NORMAL

## 2025-02-21 LAB — JOINT CULTURE: NORMAL

## 2025-05-12 ENCOUNTER — APPOINTMENT (OUTPATIENT)
Dept: ORTHOPEDIC SURGERY | Facility: CLINIC | Age: 70
End: 2025-05-12
Payer: MEDICARE

## 2025-05-12 VITALS — BODY MASS INDEX: 30.8 KG/M2 | HEIGHT: 71 IN | WEIGHT: 220 LBS

## 2025-05-12 DIAGNOSIS — Z96.651 PRESENCE OF RIGHT ARTIFICIAL KNEE JOINT: ICD-10-CM

## 2025-05-12 DIAGNOSIS — Z96.652 PRESENCE OF LEFT ARTIFICIAL KNEE JOINT: ICD-10-CM

## 2025-05-12 PROCEDURE — 73562 X-RAY EXAM OF KNEE 3: CPT | Mod: 50

## 2025-05-12 PROCEDURE — 99213 OFFICE O/P EST LOW 20 MIN: CPT

## 2025-06-13 ENCOUNTER — APPOINTMENT (OUTPATIENT)
Dept: ORTHOPEDIC SURGERY | Facility: CLINIC | Age: 70
End: 2025-06-13
Payer: MEDICARE

## 2025-06-13 ENCOUNTER — APPOINTMENT (OUTPATIENT)
Dept: MRI IMAGING | Facility: CLINIC | Age: 70
End: 2025-06-13
Payer: MEDICARE

## 2025-06-13 PROCEDURE — 73030 X-RAY EXAM OF SHOULDER: CPT | Mod: RT

## 2025-06-13 PROCEDURE — 99214 OFFICE O/P EST MOD 30 MIN: CPT | Mod: 25

## 2025-06-13 PROCEDURE — 73221 MRI JOINT UPR EXTREM W/O DYE: CPT | Mod: RT

## 2025-06-13 PROCEDURE — 73010 X-RAY EXAM OF SHOULDER BLADE: CPT | Mod: RT

## 2025-06-13 PROCEDURE — 20611 DRAIN/INJ JOINT/BURSA W/US: CPT | Mod: RT

## 2025-06-27 ENCOUNTER — APPOINTMENT (OUTPATIENT)
Dept: ORTHOPEDIC SURGERY | Facility: CLINIC | Age: 70
End: 2025-06-27
Payer: MEDICARE

## 2025-06-27 PROCEDURE — 99214 OFFICE O/P EST MOD 30 MIN: CPT | Mod: 57

## 2025-06-27 PROCEDURE — 23570 CLTX SCAPULAR FX W/O MNPJ: CPT | Mod: LT

## 2025-06-27 RX ORDER — METHYLPREDNISOLONE 4 MG/1
4 TABLET ORAL
Qty: 1 | Refills: 0 | Status: ACTIVE | COMMUNITY
Start: 2025-06-27 | End: 1900-01-01

## 2025-06-27 RX ORDER — TRAMADOL HYDROCHLORIDE 50 MG/1
50 TABLET, COATED ORAL
Qty: 20 | Refills: 0 | Status: ACTIVE | COMMUNITY
Start: 2025-06-27 | End: 1900-01-01

## (undated) DEVICE — ELCTR STRYKER NEPTUNE SMOKE EVACUATION PENCIL (GREEN)

## (undated) DEVICE — NDL HYPO SAFE 22G X 1.5" (BLACK)

## (undated) DEVICE — DRAPE SURGICAL #1010

## (undated) DEVICE — HOOD FLYTE STRYKER HELMET SHIELD

## (undated) DEVICE — SUCTION TIP KAMVAC MINI

## (undated) DEVICE — HOOD T5 PEELAWAY

## (undated) DEVICE — SUT STRATAFIX SYMMETRIC PDS PLUS 1 18" CTX VIOLET

## (undated) DEVICE — MIXER BONE CEMENT EVAC III

## (undated) DEVICE — MAKO DRAPE KIT

## (undated) DEVICE — GOWN IMPERV BREATHABLE XL

## (undated) DEVICE — POSITIONER FOAM BUMP FLAT TOP 10X6X4" LRG

## (undated) DEVICE — ZIMMER PULSAVAC PLUS FAN KIT

## (undated) DEVICE — MAKO BLADE NARROW

## (undated) DEVICE — VENODYNE/SCD SLEEVE CALF MEDIUM

## (undated) DEVICE — CRYO/CUFF GRAVITY COOLER KNEE LARGE

## (undated) DEVICE — SAW BLADE STRYKER SAGITTAL 25X0.89X75MM

## (undated) DEVICE — DRSG ACE BANDAGE 6"

## (undated) DEVICE — MAKO BLADE STANDARD

## (undated) DEVICE — WARMING BLANKET UPPER ADULT

## (undated) DEVICE — SYR LUER LOK 20CC

## (undated) DEVICE — FRA-ESU BOVIE FORCE TRIAD T6D04777DX: Type: DURABLE MEDICAL EQUIPMENT

## (undated) DEVICE — MEDICATION LABELS W MARKER

## (undated) DEVICE — MAKO VIZADISC KNEE TRACKING KIT

## (undated) DEVICE — MAKO CHECKPOINT KIT FEMORAL / TIBIAL

## (undated) DEVICE — DRSG TELFA 3 X 8

## (undated) DEVICE — SYR ASEPTO

## (undated) DEVICE — SUT STRATAFIX SPIRAL MONOCRYL PLUS 4-0 45CM PS-2 UNDYED

## (undated) DEVICE — DRSG COBAN 6"

## (undated) DEVICE — SUT STRATAFIX SPIRAL PDS PLUS 2-0 45CM CT-1

## (undated) DEVICE — DRSG DERMABOND PRINEO 22CM

## (undated) DEVICE — PREP SCRUB BRUSH W CHG 4%

## (undated) DEVICE — SUT VICRYL 0 27" CT-1 UNDYED

## (undated) DEVICE — TOURNIQUET ESMARK 6"

## (undated) DEVICE — PACK TOTAL KNEE

## (undated) DEVICE — GLV 8.5 PROTEXIS ORTHO (BROWN)

## (undated) DEVICE — GLV 8.5 PROTEXIS (WHITE)

## (undated) DEVICE — SOL IRR BAG NS 0.9% 3000ML

## (undated) DEVICE — FRA-ESU BOVIE FORCE TRIAD T6D04558DX: Type: DURABLE MEDICAL EQUIPMENT